# Patient Record
Sex: FEMALE | Race: WHITE | NOT HISPANIC OR LATINO | Employment: OTHER | URBAN - METROPOLITAN AREA
[De-identification: names, ages, dates, MRNs, and addresses within clinical notes are randomized per-mention and may not be internally consistent; named-entity substitution may affect disease eponyms.]

---

## 2017-06-26 ENCOUNTER — APPOINTMENT (OUTPATIENT)
Dept: WOMENS IMAGING | Facility: HOSPITAL | Age: 72
End: 2017-06-26

## 2017-06-26 PROCEDURE — 77063 BREAST TOMOSYNTHESIS BI: CPT | Performed by: RADIOLOGY

## 2017-06-26 PROCEDURE — G0202 SCR MAMMO BI INCL CAD: HCPCS | Performed by: RADIOLOGY

## 2017-07-05 ENCOUNTER — APPOINTMENT (OUTPATIENT)
Dept: WOMENS IMAGING | Facility: HOSPITAL | Age: 72
End: 2017-07-05

## 2017-07-05 PROCEDURE — 76641 ULTRASOUND BREAST COMPLETE: CPT | Performed by: RADIOLOGY

## 2018-04-03 ENCOUNTER — APPOINTMENT (OUTPATIENT)
Dept: WOMENS IMAGING | Facility: HOSPITAL | Age: 73
End: 2018-04-03

## 2018-04-03 PROCEDURE — 76641 ULTRASOUND BREAST COMPLETE: CPT | Performed by: RADIOLOGY

## 2018-04-03 PROCEDURE — 77065 DX MAMMO INCL CAD UNI: CPT | Performed by: RADIOLOGY

## 2018-04-03 PROCEDURE — G0279 TOMOSYNTHESIS, MAMMO: HCPCS | Performed by: RADIOLOGY

## 2019-09-10 ENCOUNTER — APPOINTMENT (OUTPATIENT)
Dept: WOMENS IMAGING | Facility: HOSPITAL | Age: 74
End: 2019-09-10

## 2019-09-10 PROCEDURE — 77063 BREAST TOMOSYNTHESIS BI: CPT | Performed by: RADIOLOGY

## 2019-09-10 PROCEDURE — 77067 SCR MAMMO BI INCL CAD: CPT | Performed by: RADIOLOGY

## 2021-07-30 ENCOUNTER — APPOINTMENT (OUTPATIENT)
Dept: WOMENS IMAGING | Facility: HOSPITAL | Age: 76
End: 2021-07-30

## 2021-07-30 PROCEDURE — 77063 BREAST TOMOSYNTHESIS BI: CPT | Performed by: RADIOLOGY

## 2021-07-30 PROCEDURE — 77067 SCR MAMMO BI INCL CAD: CPT | Performed by: RADIOLOGY

## 2022-08-23 ENCOUNTER — APPOINTMENT (OUTPATIENT)
Dept: WOMENS IMAGING | Facility: HOSPITAL | Age: 77
End: 2022-08-23

## 2022-08-23 PROCEDURE — 77067 SCR MAMMO BI INCL CAD: CPT | Performed by: RADIOLOGY

## 2022-08-23 PROCEDURE — 77063 BREAST TOMOSYNTHESIS BI: CPT | Performed by: RADIOLOGY

## 2022-09-07 ENCOUNTER — APPOINTMENT (OUTPATIENT)
Dept: WOMENS IMAGING | Facility: HOSPITAL | Age: 77
End: 2022-09-07

## 2022-09-07 PROCEDURE — G0279 TOMOSYNTHESIS, MAMMO: HCPCS | Performed by: RADIOLOGY

## 2022-09-07 PROCEDURE — 76642 ULTRASOUND BREAST LIMITED: CPT | Performed by: RADIOLOGY

## 2022-09-07 PROCEDURE — 77065 DX MAMMO INCL CAD UNI: CPT | Performed by: RADIOLOGY

## 2022-09-20 ENCOUNTER — HOSPITAL ENCOUNTER (OUTPATIENT)
Dept: ULTRASOUND IMAGING | Facility: HOSPITAL | Age: 77
Discharge: HOME | End: 2022-09-20

## 2022-09-20 ENCOUNTER — HOSPITAL ENCOUNTER (OUTPATIENT)
Dept: MAMMOGRAPHY | Facility: HOSPITAL | Age: 77
Discharge: HOME | End: 2022-09-20

## 2022-09-20 DIAGNOSIS — N63.20 MASS OF LEFT BREAST: ICD-10-CM

## 2022-09-20 DIAGNOSIS — R92.8 ABNORMAL MAMMOGRAM OF LEFT BREAST: ICD-10-CM

## 2022-09-20 PROCEDURE — 88360 TUMOR IMMUNOHISTOCHEM/MANUAL: CPT | Performed by: FAMILY MEDICINE

## 2022-09-20 PROCEDURE — 0 LIDOCAINE 1 % SOLUTION: Performed by: FAMILY MEDICINE

## 2022-09-20 PROCEDURE — 88305 TISSUE EXAM BY PATHOLOGIST: CPT | Performed by: FAMILY MEDICINE

## 2022-09-20 RX ORDER — LIDOCAINE HYDROCHLORIDE AND EPINEPHRINE 10; 10 MG/ML; UG/ML
10 INJECTION, SOLUTION INFILTRATION; PERINEURAL ONCE
Status: COMPLETED | OUTPATIENT
Start: 2022-09-20 | End: 2022-09-20

## 2022-09-20 RX ORDER — LIDOCAINE HYDROCHLORIDE 10 MG/ML
5 INJECTION, SOLUTION INFILTRATION; PERINEURAL ONCE
Status: COMPLETED | OUTPATIENT
Start: 2022-09-20 | End: 2022-09-20

## 2022-09-20 RX ADMIN — Medication 5 ML: at 13:53

## 2022-09-20 RX ADMIN — LIDOCAINE HYDROCHLORIDE,EPINEPHRINE BITARTRATE 10 ML: 10; .01 INJECTION, SOLUTION INFILTRATION; PERINEURAL at 13:53

## 2022-09-23 LAB
LAB AP CASE REPORT: NORMAL
LAB AP CLINICAL INFORMATION: NORMAL
LAB AP DIAGNOSIS COMMENT: NORMAL
PATH REPORT.ADDENDUM SPEC: NORMAL
PATH REPORT.FINAL DX SPEC: NORMAL
PATH REPORT.GROSS SPEC: NORMAL

## 2022-09-28 ENCOUNTER — OFFICE VISIT (OUTPATIENT)
Dept: SURGERY | Facility: CLINIC | Age: 77
End: 2022-09-28

## 2022-09-28 VITALS
WEIGHT: 193 LBS | OXYGEN SATURATION: 94 % | TEMPERATURE: 98.4 F | HEIGHT: 69 IN | BODY MASS INDEX: 28.58 KG/M2 | HEART RATE: 59 BPM | DIASTOLIC BLOOD PRESSURE: 85 MMHG | SYSTOLIC BLOOD PRESSURE: 168 MMHG

## 2022-09-28 DIAGNOSIS — C50.212 MALIGNANT NEOPLASM OF UPPER-INNER QUADRANT OF LEFT BREAST IN FEMALE, ESTROGEN RECEPTOR POSITIVE: Primary | ICD-10-CM

## 2022-09-28 DIAGNOSIS — Z17.0 MALIGNANT NEOPLASM OF UPPER-INNER QUADRANT OF LEFT BREAST IN FEMALE, ESTROGEN RECEPTOR POSITIVE: Primary | ICD-10-CM

## 2022-09-28 PROBLEM — I34.0 MILD MITRAL REGURGITATION: Status: ACTIVE | Noted: 2020-05-06

## 2022-09-28 PROBLEM — N39.46 MIXED INCONTINENCE: Status: ACTIVE | Noted: 2022-09-28

## 2022-09-28 PROBLEM — N95.9 MENOPAUSAL AND POSTMENOPAUSAL DISORDER: Status: ACTIVE | Noted: 2022-09-28

## 2022-09-28 PROBLEM — I10 BENIGN ESSENTIAL HYPERTENSION: Status: ACTIVE | Noted: 2022-09-28

## 2022-09-28 PROBLEM — N81.4 UTEROVAGINAL PROLAPSE: Status: ACTIVE | Noted: 2022-09-28

## 2022-09-28 PROBLEM — C50.919 MALIGNANT NEOPLASM OF FEMALE BREAST: Status: ACTIVE | Noted: 2022-09-28

## 2022-09-28 PROBLEM — E55.9 VITAMIN D DEFICIENCY: Status: ACTIVE | Noted: 2022-09-28

## 2022-09-28 PROBLEM — E78.5 HYPERLIPIDEMIA: Status: ACTIVE | Noted: 2019-09-18

## 2022-09-28 LAB — REF LAB TEST METHOD: NORMAL

## 2022-09-28 PROCEDURE — 99204 OFFICE O/P NEW MOD 45 MIN: CPT | Performed by: SURGERY

## 2022-09-28 RX ORDER — CARBOXYMETHYLCELLULOSE SODIUM 10 MG/ML
GEL OPHTHALMIC 3 TIMES DAILY PRN
COMMUNITY
End: 2022-10-20 | Stop reason: HOSPADM

## 2022-09-28 RX ORDER — DIPHENOXYLATE HYDROCHLORIDE AND ATROPINE SULFATE 2.5; .025 MG/1; MG/1
1 TABLET ORAL DAILY
COMMUNITY

## 2022-09-28 RX ORDER — NEBIVOLOL HYDROCHLORIDE 10 MG/1
10 TABLET ORAL EVERY EVENING
COMMUNITY
Start: 2022-07-01

## 2022-09-28 RX ORDER — AMLODIPINE BESYLATE 5 MG/1
5 TABLET ORAL DAILY
COMMUNITY
Start: 2022-07-01

## 2022-09-28 RX ORDER — ASPIRIN 81 MG/1
81 TABLET, CHEWABLE ORAL DAILY
COMMUNITY

## 2022-09-30 RX ORDER — CHLORHEXIDINE GLUCONATE 0.12 MG/ML
15 RINSE ORAL EVERY 12 HOURS SCHEDULED
Status: CANCELLED | OUTPATIENT
Start: 2022-09-30

## 2022-09-30 RX ORDER — LIDOCAINE 40 MG/G
1 CREAM TOPICAL AS NEEDED
Status: CANCELLED | OUTPATIENT
Start: 2022-09-30

## 2022-09-30 RX ORDER — SODIUM CHLORIDE 9 MG/ML
100 INJECTION, SOLUTION INTRAVENOUS CONTINUOUS
Status: CANCELLED | OUTPATIENT
Start: 2022-09-30

## 2022-09-30 NOTE — PROGRESS NOTES
GENERAL SURGERY CONSULTATION NOTE    Consult requested by: Dr. Blackman    Patient Care Team:  Sherine Blackman MD as PCP - General (Family Medicine)  Jaime De Anda MD as Surgeon (General Surgery)    Reason for consult: Left breast cancer    Subjective     Patient is a 77 y.o. female presents with a new diagnosis of left breast cancer.  The patient's past surgical history is significant for right breast cancer for which she underwent a modified radical mastectomy and TRAM flap in 1995.  She has done well until recently when on her routine screening mammogram there was noted to be a mass in the left breast at the 11 o'clock position.  She then subsequently underwent a diagnostic mammogram and ultrasound of the left breast on 9/7/2022 which demonstrated a 9 x 8 x 6 mm irregular solid mass in the posterior one third region of the left breast at the 11 o'clock position which was located 8 cm from the nipple.  This was considered to be suspicious and biopsy was recommended.  The patient underwent core needle biopsy which demonstrated invasive well differentiated ductal carcinoma.  This was ER positive at 100%, SC positive at 90%.  The patient's past medical history is significant as above.  The patient began having her periods at age 15, she went through menopause at the age of 54 and still has her uterus and both ovaries.  She has been pregnant 4 times with 2 live births and 2 miscarriages.  Her first child was born when she was 25 years old and she never breast-fed.  She took birth control pills for approximately 1 year and is never taken any hormone replacement therapy  Family history is as below.     Review of Systems   Constitutional: Negative for appetite change, chills and fever.   HENT: Positive for tinnitus. Negative for congestion and sore throat.    Respiratory: Negative for cough and shortness of breath.    Cardiovascular: Negative for chest pain and palpitations.   Gastrointestinal:  Negative for abdominal pain, constipation, diarrhea, nausea, vomiting and GERD.   Genitourinary: Negative for breast discharge, breast lump, breast pain, difficulty urinating, dysuria and frequency.   Musculoskeletal: Negative for arthralgias and back pain.   Skin: Negative for rash and skin lesions.   Neurological: Negative for dizziness, seizures and memory problem.   Hematological: Negative for adenopathy. Does not bruise/bleed easily.   Psychiatric/Behavioral: Negative for sleep disturbance and depressed mood.        History  History reviewed. No pertinent past medical history.  Past Surgical History:   Procedure Laterality Date   • CATARACT EXTRACTION     • SIMPLE MASTECTOMY Right     1995   • TONSILLECTOMY     • TOOTH EXTRACTION       History reviewed. No pertinent family history.  Social History     Tobacco Use   • Smoking status: Never Smoker   • Smokeless tobacco: Never Used   Vaping Use   • Vaping Use: Never used     (Not in a hospital admission)    Allergies:  Statins, Acetaminophen, and Latex    Objective     Vital Signs       Physical Exam  Vitals reviewed. Exam conducted with a chaperone present.   Constitutional:       Appearance: She is well-developed.   HENT:      Head: Normocephalic and atraumatic.   Eyes:      Pupils: Pupils are equal, round, and reactive to light.   Cardiovascular:      Rate and Rhythm: Normal rate and regular rhythm.   Pulmonary:      Effort: Pulmonary effort is normal.      Breath sounds: Normal breath sounds.   Chest:   Breasts:      Right: No axillary adenopathy or supraclavicular adenopathy.      Left: No axillary adenopathy or supraclavicular adenopathy.        Comments: The patient's right breast is surgically absent with a well-healed incision from her prior TRAM flap.  There is no evidence of any necrosis of the flap, and no surrounding erythema, ration, or drainage from the incisions.  The left breast is of normal shape and contour without any dominant masses.  There is  no discharge from the nipple, no overlying skin changes.  Abdominal:      General: There is no distension.      Palpations: Abdomen is soft.      Tenderness: There is no abdominal tenderness.      Hernia: No hernia is present.   Musculoskeletal:         General: Normal range of motion.      Cervical back: Normal range of motion.   Lymphadenopathy:      Cervical: No cervical adenopathy.      Upper Body:      Right upper body: No supraclavicular or axillary adenopathy.      Left upper body: No supraclavicular or axillary adenopathy.   Skin:     General: Skin is warm and dry.      Findings: No rash.   Neurological:      Mental Status: She is alert and oriented to person, place, and time.         Results Review:   Lab Results (last 24 hours)     ** No results found for the last 24 hours. **        No radiology results for the last day      I reviewed the patient's new imaging results and agree with the interpretation.  I reviewed the patient's other test results and agree with the interpretation    Assessment & Plan   Left breast cancer    We reviewed the patient's imaging, and her pathology reports in detail.  We discussed that breast cancer is treated in a multidisciplinary fashion, with input from radiation oncology, medical oncology, and general surgery.  We discussed that the patient's case will be discussed at a multidisciplinary tumor board meeting held every other week.  We then discussed the diagnosis of breast cancer and that most breast cancers arise either from the ducts or from the lobules.  Using visual aids, we discussed the difference between invasive and in situ disease, especially whether or not the lymph nodes need to be evaluated.  Usually, with in situ disease, lymph nodes are not examined.  However, examining the lymph nodes should be considered if the area of concern is widespread or if it is high-grade.  We also discussed hormone receptors, and discussed that there are medications which can be  given if the hormone receptors were positive which can be used to treat the cancer, and to provide protection in not only the breast with cancer, but the contralateral breast as well.  The surgical options were discussed with the patient which include breast conservation therapy (lumpectomy with radiation) and mastectomy.  With regards to mastectomy, we discussed that reconstruction may be performed either at the time of the surgery, or in a delayed fashion.  We discussed that the survival benefit between these 2 procedures are equivalent, and therefore they are considered oncologically appropriate.  However, some women will choose one versus the other for a multitude of factors.  There are times when a lumpectomy is not a feasible option, these include but are not limited to tumor to breast ratio, the location of the tumor, previous radiation to the chest wall, or connective tissue disorders which would make radiation treatment ineffective.  This patient has elected to undergo breast conservation therapy and understands that this may include a lumpectomy with evaluation of her lymph nodes, followed by radiation.  There may or may not be a role for chemotherapy or hormonal therapy following her surgery as well.  This will be managed by medical oncology.  We discussed that in order for the surgery to be effective we will need to have clear surgical margins, and that if the margins are positive, then she may require further surgical excision versus a prolonged course of radiation.  The risk, benefits, and alternatives to surgery were discussed with the patient which include but are not limited to: bleeding, infection, damage to nerves/blood vessels, and pain.    The patient understands, and agrees to proceed with wire localized left breast lumpectomy with sentinel lymph node biopsy.    I discussed the patients findings and my recommendations with the patient and her .     Jaime De Anda,  MD  09/30/22  17:54 EDT

## 2022-10-10 RX ORDER — SPIRONOLACTONE 25 MG/1
25 TABLET ORAL DAILY PRN
COMMUNITY

## 2022-10-10 RX ORDER — MELATONIN
1000 EVERY EVENING
COMMUNITY

## 2022-10-10 RX ORDER — CYCLOBENZAPRINE HCL 10 MG
10 TABLET ORAL AS NEEDED
COMMUNITY

## 2022-10-10 RX ORDER — MOMETASONE FUROATE 1 MG/G
1 CREAM TOPICAL AS NEEDED
COMMUNITY

## 2022-10-20 ENCOUNTER — HOSPITAL ENCOUNTER (OUTPATIENT)
Facility: HOSPITAL | Age: 77
Setting detail: HOSPITAL OUTPATIENT SURGERY
Discharge: HOME | End: 2022-10-20
Attending: SURGERY | Admitting: SURGERY

## 2022-10-20 ENCOUNTER — HOSPITAL ENCOUNTER (OUTPATIENT)
Dept: MAMMOGRAPHY | Facility: HOSPITAL | Age: 77
Discharge: HOME | End: 2022-10-20

## 2022-10-20 ENCOUNTER — ANESTHESIA EVENT (OUTPATIENT)
Dept: PERIOP | Facility: HOSPITAL | Age: 77
End: 2022-10-20

## 2022-10-20 ENCOUNTER — HOSPITAL ENCOUNTER (OUTPATIENT)
Dept: NUCLEAR MEDICINE | Facility: HOSPITAL | Age: 77
Discharge: HOME | End: 2022-10-20

## 2022-10-20 ENCOUNTER — ANESTHESIA (OUTPATIENT)
Dept: PERIOP | Facility: HOSPITAL | Age: 77
End: 2022-10-20

## 2022-10-20 ENCOUNTER — APPOINTMENT (OUTPATIENT)
Dept: ULTRASOUND IMAGING | Facility: HOSPITAL | Age: 77
End: 2022-10-20

## 2022-10-20 ENCOUNTER — HOSPITAL ENCOUNTER (OUTPATIENT)
Dept: ULTRASOUND IMAGING | Facility: HOSPITAL | Age: 77
Discharge: HOME | End: 2022-10-20

## 2022-10-20 VITALS
SYSTOLIC BLOOD PRESSURE: 157 MMHG | WEIGHT: 193 LBS | HEIGHT: 68 IN | TEMPERATURE: 97.8 F | DIASTOLIC BLOOD PRESSURE: 80 MMHG | BODY MASS INDEX: 29.25 KG/M2 | HEART RATE: 73 BPM | OXYGEN SATURATION: 97 % | RESPIRATION RATE: 9 BRPM

## 2022-10-20 DIAGNOSIS — C50.212 MALIGNANT NEOPLASM OF UPPER-INNER QUADRANT OF LEFT BREAST IN FEMALE, ESTROGEN RECEPTOR POSITIVE: ICD-10-CM

## 2022-10-20 DIAGNOSIS — Z17.0 MALIGNANT NEOPLASM OF UPPER-INNER QUADRANT OF LEFT BREAST IN FEMALE, ESTROGEN RECEPTOR POSITIVE: ICD-10-CM

## 2022-10-20 PROCEDURE — 25010000002 CEFAZOLIN PER 500 MG: Performed by: SURGERY

## 2022-10-20 PROCEDURE — 88307 TISSUE EXAM BY PATHOLOGIST: CPT | Performed by: SURGERY

## 2022-10-20 PROCEDURE — 25010000002 PROPOFOL 200 MG/20ML EMULSION: Performed by: NURSE ANESTHETIST, CERTIFIED REGISTERED

## 2022-10-20 PROCEDURE — 25010000002 FENTANYL CITRATE (PF) 50 MCG/ML SOLUTION: Performed by: NURSE ANESTHETIST, CERTIFIED REGISTERED

## 2022-10-20 PROCEDURE — 25010000002 DEXAMETHASONE PER 1 MG: Performed by: NURSE ANESTHETIST, CERTIFIED REGISTERED

## 2022-10-20 PROCEDURE — 25010000002 ONDANSETRON PER 1 MG: Performed by: NURSE ANESTHETIST, CERTIFIED REGISTERED

## 2022-10-20 PROCEDURE — C1819 TISSUE LOCALIZATION-EXCISION: HCPCS

## 2022-10-20 PROCEDURE — 19301 PARTIAL MASTECTOMY: CPT | Performed by: SURGERY

## 2022-10-20 PROCEDURE — 0 TECHETIUM TC99M TILMANOCEPT: Performed by: SURGERY

## 2022-10-20 PROCEDURE — 38792 RA TRACER ID OF SENTINL NODE: CPT

## 2022-10-20 PROCEDURE — 76098 X-RAY EXAM SURGICAL SPECIMEN: CPT

## 2022-10-20 PROCEDURE — A9520 TC99 TILMANOCEPT DIAG 0.5MCI: HCPCS | Performed by: SURGERY

## 2022-10-20 PROCEDURE — 76999 ECHO EXAMINATION PROCEDURE: CPT

## 2022-10-20 PROCEDURE — 0 LIDOCAINE 1 % SOLUTION: Performed by: SURGERY

## 2022-10-20 PROCEDURE — 38525 BIOPSY/REMOVAL LYMPH NODES: CPT | Performed by: SURGERY

## 2022-10-20 PROCEDURE — 38900 IO MAP OF SENT LYMPH NODE: CPT | Performed by: SURGERY

## 2022-10-20 RX ORDER — HYDROCODONE BITARTRATE AND ACETAMINOPHEN 10; 325 MG/1; MG/1
1 TABLET ORAL EVERY 4 HOURS PRN
Status: DISCONTINUED | OUTPATIENT
Start: 2022-10-20 | End: 2022-10-20 | Stop reason: HOSPADM

## 2022-10-20 RX ORDER — SODIUM CHLORIDE, SODIUM LACTATE, POTASSIUM CHLORIDE, CALCIUM CHLORIDE 600; 310; 30; 20 MG/100ML; MG/100ML; MG/100ML; MG/100ML
INJECTION, SOLUTION INTRAVENOUS CONTINUOUS PRN
Status: DISCONTINUED | OUTPATIENT
Start: 2022-10-20 | End: 2022-10-20 | Stop reason: SURG

## 2022-10-20 RX ORDER — HYDROCODONE BITARTRATE AND ACETAMINOPHEN 10; 325 MG/1; MG/1
1 TABLET ORAL ONCE AS NEEDED
Status: DISCONTINUED | OUTPATIENT
Start: 2022-10-20 | End: 2022-10-20 | Stop reason: HOSPADM

## 2022-10-20 RX ORDER — HYDROCODONE BITARTRATE AND ACETAMINOPHEN 5; 325 MG/1; MG/1
1-2 TABLET ORAL EVERY 4 HOURS PRN
Qty: 30 TABLET | Refills: 0 | Status: SHIPPED | OUTPATIENT
Start: 2022-10-20 | End: 2022-10-31

## 2022-10-20 RX ORDER — SODIUM CHLORIDE 9 MG/ML
100 INJECTION, SOLUTION INTRAVENOUS CONTINUOUS
Status: DISCONTINUED | OUTPATIENT
Start: 2022-10-20 | End: 2022-10-20 | Stop reason: HOSPADM

## 2022-10-20 RX ORDER — BUPIVACAINE HYDROCHLORIDE AND EPINEPHRINE 5; 5 MG/ML; UG/ML
INJECTION, SOLUTION EPIDURAL; INTRACAUDAL; PERINEURAL AS NEEDED
Status: DISCONTINUED | OUTPATIENT
Start: 2022-10-20 | End: 2022-10-20 | Stop reason: HOSPADM

## 2022-10-20 RX ORDER — ONDANSETRON 2 MG/ML
INJECTION INTRAMUSCULAR; INTRAVENOUS AS NEEDED
Status: DISCONTINUED | OUTPATIENT
Start: 2022-10-20 | End: 2022-10-20 | Stop reason: SURG

## 2022-10-20 RX ORDER — EPHEDRINE SULFATE 5 MG/ML
INJECTION INTRAVENOUS AS NEEDED
Status: DISCONTINUED | OUTPATIENT
Start: 2022-10-20 | End: 2022-10-20 | Stop reason: SURG

## 2022-10-20 RX ORDER — PROPOFOL 10 MG/ML
INJECTION, EMULSION INTRAVENOUS AS NEEDED
Status: DISCONTINUED | OUTPATIENT
Start: 2022-10-20 | End: 2022-10-20 | Stop reason: SURG

## 2022-10-20 RX ORDER — LIDOCAINE 40 MG/G
1 CREAM TOPICAL AS NEEDED
Status: DISCONTINUED | OUTPATIENT
Start: 2022-10-20 | End: 2022-10-20 | Stop reason: HOSPADM

## 2022-10-20 RX ORDER — DEXAMETHASONE SODIUM PHOSPHATE 4 MG/ML
INJECTION, SOLUTION INTRA-ARTICULAR; INTRALESIONAL; INTRAMUSCULAR; INTRAVENOUS; SOFT TISSUE AS NEEDED
Status: DISCONTINUED | OUTPATIENT
Start: 2022-10-20 | End: 2022-10-20 | Stop reason: SURG

## 2022-10-20 RX ORDER — FENTANYL CITRATE 50 UG/ML
INJECTION, SOLUTION INTRAMUSCULAR; INTRAVENOUS AS NEEDED
Status: DISCONTINUED | OUTPATIENT
Start: 2022-10-20 | End: 2022-10-20 | Stop reason: SURG

## 2022-10-20 RX ORDER — LIDOCAINE HYDROCHLORIDE 10 MG/ML
10 INJECTION, SOLUTION INFILTRATION; PERINEURAL ONCE
Status: COMPLETED | OUTPATIENT
Start: 2022-10-20 | End: 2022-10-20

## 2022-10-20 RX ORDER — LIDOCAINE HYDROCHLORIDE 20 MG/ML
INJECTION, SOLUTION EPIDURAL; INFILTRATION; INTRACAUDAL; PERINEURAL AS NEEDED
Status: DISCONTINUED | OUTPATIENT
Start: 2022-10-20 | End: 2022-10-20 | Stop reason: SURG

## 2022-10-20 RX ORDER — CHLORHEXIDINE GLUCONATE 0.12 MG/ML
15 RINSE ORAL EVERY 12 HOURS SCHEDULED
Status: DISCONTINUED | OUTPATIENT
Start: 2022-10-20 | End: 2022-10-20

## 2022-10-20 RX ORDER — CHLORHEXIDINE GLUCONATE 0.12 MG/ML
15 RINSE ORAL EVERY 12 HOURS SCHEDULED
Status: DISCONTINUED | OUTPATIENT
Start: 2022-10-20 | End: 2022-10-20 | Stop reason: HOSPADM

## 2022-10-20 RX ADMIN — SODIUM CHLORIDE 100 ML/HR: 0.9 INJECTION, SOLUTION INTRAVENOUS at 12:30

## 2022-10-20 RX ADMIN — SODIUM CHLORIDE, SODIUM LACTATE, POTASSIUM CHLORIDE, AND CALCIUM CHLORIDE: .6; .31; .03; .02 INJECTION, SOLUTION INTRAVENOUS at 14:30

## 2022-10-20 RX ADMIN — EPHEDRINE SULFATE 5 MG: 5 INJECTION INTRAVENOUS at 15:08

## 2022-10-20 RX ADMIN — CEFAZOLIN 2 G: 2 INJECTION, POWDER, FOR SOLUTION INTRAMUSCULAR; INTRAVENOUS at 14:30

## 2022-10-20 RX ADMIN — CHLORHEXIDINE GLUCONATE 15 ML: 1.2 SOLUTION ORAL at 13:00

## 2022-10-20 RX ADMIN — FENTANYL CITRATE 50 MCG: 50 INJECTION, SOLUTION INTRAMUSCULAR; INTRAVENOUS at 14:35

## 2022-10-20 RX ADMIN — DEXAMETHASONE SODIUM PHOSPHATE 4 MG: 4 INJECTION, SOLUTION INTRAMUSCULAR; INTRAVENOUS at 14:28

## 2022-10-20 RX ADMIN — LIDOCAINE 4% 1 APPLICATION: 4 CREAM TOPICAL at 12:00

## 2022-10-20 RX ADMIN — ONDANSETRON 8 MG: 2 INJECTION INTRAMUSCULAR; INTRAVENOUS at 14:31

## 2022-10-20 RX ADMIN — PROPOFOL 200 MG: 10 INJECTION, EMULSION INTRAVENOUS at 14:36

## 2022-10-20 RX ADMIN — FENTANYL CITRATE 50 MCG: 50 INJECTION, SOLUTION INTRAMUSCULAR; INTRAVENOUS at 15:21

## 2022-10-20 RX ADMIN — LIDOCAINE HYDROCHLORIDE 60 MG: 20 INJECTION, SOLUTION EPIDURAL; INFILTRATION; INTRACAUDAL at 14:28

## 2022-10-20 RX ADMIN — PROPOFOL 200 MG: 10 INJECTION, EMULSION INTRAVENOUS at 14:28

## 2022-10-20 RX ADMIN — TILMANOCEPT 1 DOSE: KIT at 13:30

## 2022-10-20 RX ADMIN — LIDOCAINE HYDROCHLORIDE 10 ML: 10 INJECTION, SOLUTION INFILTRATION; PERINEURAL at 13:22

## 2022-10-20 RX ADMIN — MUPIROCIN 2 APPLICATION: 20 OINTMENT TOPICAL at 13:22

## 2022-10-20 NOTE — ANESTHESIA PROCEDURE NOTES
Airway  Urgency: elective    Airway not difficult    General Information and Staff    Patient location during procedure: OR    Indications and Patient Condition  Indications for airway management: airway protection    Preoxygenated: yes  MILS maintained throughout  Mask difficulty assessment: 0 - not attempted    Final Airway Details  Final airway type: supraglottic airway      Successful airway: LMA  Size 4     Cormack-Lehane Classification: grade I - full view of glottis  Number of attempts at approach: 1  Assessment: lips, teeth, and gum same as pre-op

## 2022-10-20 NOTE — OP NOTE
BREAST LUMPECTOMY, SENTINEL NODE BIOPSY  Operative Note    Patient Name:  Shasha Freeman  YOB: 1945    Date of Surgery:  10/20/2022     Indications:  Patient is a 77-year-old lady who presented to the office with a left breast cancer.  We discussed the risk, benefits, and alternatives to surgery, the patient agreed to proceed the operating room for wire localized lumpectomy with sentinel lymph node biopsy on the left.    Pre-op Diagnosis:   Malignant neoplasm of upper-inner quadrant of left breast in female, estrogen receptor positive (HCC) [C50.212, Z17.0]    Post-op Diagnosis:  Post-Op Diagnosis Codes:     * Malignant neoplasm of upper-inner quadrant of left breast in female, estrogen receptor positive (HCC) [C50.212, Z17.0]    Procedure/CPT® Codes:      Procedure(s):  BREAST LUMPECTOMY  SENTINEL NODE BIOPSY    Staff:  Surgeon(s):  Jaime De Anda MD    Assistant: Allie Caceres was responsible for performing the following activities: Retraction, Suction and Closing and their skilled assistance was necessary for the success of this case.     Anesthesia: General    Estimated Blood Loss: minimal    Implants:    Nothing was implanted during the procedure    Specimen:          Specimens     ID Source Type Tests Collected By Collected At Frozen?    A Breast, Left Tissue · TISSUE PATHOLOGY EXAM   Jaime De Anda MD 10/20/22 4301     Description: Left breast lumpectomy short-superior, long-lateral, double-deep    Comment: Please call ext [phone]    B Laurens Lymph Node Tissue · TISSUE PATHOLOGY EXAM   Jaime De Anda MD 10/20/22 2694     Description: Laurens node     This specimen was not marked as sent.          Findings: Wire and mass within the lumpectomy specimen, discussed with Dr. Landin with radiology, and the clip could not be seen radiographically, nor could it be identified on ultrasound, however the mass did appear to be within the specimen.  Laurens lymph node  measuring 6449 on neoprobe ex vivo    Complications: None, immediately    Description of Procedure: After obtaining informed consent in the preop holding area, the patient was brought the operating room placed in supine position.  SCDs were applied, preoperative antibiotics were administered.  Patient then underwent uncomplicated induction of general endotracheal anesthesia.  The left chest and axilla were then prepped and draped in the usual sterile fashion, and after a brief timeout the procedure began.  I began by making a curvilinear incision on the superior aspect of the nipple areolar complex and dissected superiorly until the wire could be seen entering the incision above the subcutaneous fat.  After doing this, I brought the wire through the incision, and grasped the surrounding subcutaneous fat and breast tissue with an Allis clamp.  Using electrocautery I was able to circumferentially excise a lump of subcutaneous fat and breast tissue surrounding the breast mass.  With the lump now completely removed, it was marked with a short stitch marking the superior margin, long stitch marking the lateral margin, double stitch marking the deep margin.  The specimen was then passed off for mammographic review where it was confirmed that the wire and the mass were within the lumpectomy specimen.  It was discussed with Dr. Landin who then also performed an ultrasound of the specimen confirming the presence of the mass within the specimen, because the clip could not be adequately visualized mammographically nor sonographically.  However, given the fact that the wire was in good position and the mass can be seen within the specimen it was felt as though the lumpectomy was adequate.  I then turned my attention towards location of a sentinel lymph node which was to be removed for treatment purposes.  I found an area of increased uptake within the left axilla I made an incision in the left axilla below the hairbearing  area.  I dissected down through the subcutaneous fat using electrocautery to the level of the clavipectoral fascia which was incised using electrocautery to expose the axillary fat pad below.  I then found an area of increased uptake and grasped the axillary fat pad surrounding the lymph node with an Allis clamp.  I then used electrocautery to divide the small lymphatic and feeding blood vessels to the lymph node.  With the lymph node now completely removed, it was examined ex vivo and measured 6449 on neoprobe.  I then returned to the axilla and the lumpectomy sites and confirmed hemostasis using electrocautery.  Both sites were irrigated with copious amounts of warm sterile water.  We then injected local anesthesia and then closed the incisions using interrupted 3-0 Vicryl to reapproximate the deep subcutaneous fat layers, interrupted 3-0 Vicryl to reapproximate the dermis, and running 4-0 Monocryl in a subcuticular fashion to reapproximate the skin.  The wounds were dressed with skin glue, fluffs, ABD, and a brassiere.  The patient tolerated the procedure well, was awoken, and taken to PACU in satisfactory condition.    Jaime De Anda MD     Date: 10/20/2022  Time: 15:35 EDT

## 2022-10-20 NOTE — DISCHARGE INSTRUCTIONS
Ok for discharge  Follow-up with me (Dr. De Anda) in 2 weeks  Regular diet as tolerated  Ok to shower starting tomorrow. No tub baths, pools, lakes, or streams for 1 week.  Ok to apply ice to incisions  Please wear brassiere at all times except when showering  Call my office or present to the ED with: fevers greater than 101.5, redness around the incisions, drainage from the incisions, intractable nausea/vomiting, or pain that is getting worse instead of better

## 2022-10-20 NOTE — ANESTHESIA POSTPROCEDURE EVALUATION
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        Post Anesthesia Care and Evaluation    Patient location during evaluation: PACU  Patient participation: complete - patient participated  Level of consciousness: awake  Pain scale: See nurse's notes for pain score.  Pain management: adequate    Airway patency: patent  Anesthetic complications: No anesthetic complications  PONV Status: none  Cardiovascular status: acceptable  Respiratory status: acceptable and spontaneous ventilation  Hydration status: acceptable    Comments: Patient seen and examined postoperatively; vital signs stable; SpO2 greater than or equal to 90%; cardiopulmonary status stable; nausea/vomiting adequately controlled; pain adequately controlled; no apparent anesthesia complications; patient discharged from anesthesia care when discharge criteria were met

## 2022-10-21 ENCOUNTER — TELEPHONE (OUTPATIENT)
Dept: SURGERY | Facility: CLINIC | Age: 77
End: 2022-10-21

## 2022-10-21 LAB
LAB AP CASE REPORT: NORMAL
LAB AP CLINICAL INFORMATION: NORMAL
LAB AP SYNOPTIC CHECKLIST: NORMAL
PATH REPORT.FINAL DX SPEC: NORMAL
PATH REPORT.GROSS SPEC: NORMAL

## 2022-10-31 ENCOUNTER — OFFICE VISIT (OUTPATIENT)
Dept: SURGERY | Facility: CLINIC | Age: 77
End: 2022-10-31

## 2022-10-31 VITALS
DIASTOLIC BLOOD PRESSURE: 71 MMHG | HEIGHT: 68 IN | OXYGEN SATURATION: 95 % | TEMPERATURE: 98.6 F | HEART RATE: 61 BPM | SYSTOLIC BLOOD PRESSURE: 160 MMHG | WEIGHT: 194.4 LBS | RESPIRATION RATE: 16 BRPM | BODY MASS INDEX: 29.46 KG/M2

## 2022-10-31 DIAGNOSIS — Z17.0 MALIGNANT NEOPLASM OF UPPER-INNER QUADRANT OF LEFT BREAST IN FEMALE, ESTROGEN RECEPTOR POSITIVE: Primary | ICD-10-CM

## 2022-10-31 DIAGNOSIS — C50.212 MALIGNANT NEOPLASM OF UPPER-INNER QUADRANT OF LEFT BREAST IN FEMALE, ESTROGEN RECEPTOR POSITIVE: Primary | ICD-10-CM

## 2022-10-31 PROCEDURE — 99024 POSTOP FOLLOW-UP VISIT: CPT | Performed by: SURGERY

## 2022-11-01 ENCOUNTER — HOSPITAL ENCOUNTER (OUTPATIENT)
Dept: RADIATION ONCOLOGY | Facility: HOSPITAL | Age: 77
Setting detail: RADIATION/ONCOLOGY SERIES
End: 2022-11-01

## 2022-11-01 ENCOUNTER — NURSE NAVIGATOR (OUTPATIENT)
Dept: ONCOLOGY | Facility: CLINIC | Age: 77
End: 2022-11-01

## 2022-11-01 ENCOUNTER — CONSULT (OUTPATIENT)
Dept: RADIATION ONCOLOGY | Facility: HOSPITAL | Age: 77
End: 2022-11-01

## 2022-11-01 VITALS
RESPIRATION RATE: 16 BRPM | TEMPERATURE: 98.4 F | BODY MASS INDEX: 29.7 KG/M2 | DIASTOLIC BLOOD PRESSURE: 78 MMHG | SYSTOLIC BLOOD PRESSURE: 164 MMHG | HEART RATE: 67 BPM | WEIGHT: 196 LBS | OXYGEN SATURATION: 97 % | HEIGHT: 68 IN

## 2022-11-01 DIAGNOSIS — Z17.0 MALIGNANT NEOPLASM OF LOWER-INNER QUADRANT OF LEFT BREAST IN FEMALE, ESTROGEN RECEPTOR POSITIVE: Primary | ICD-10-CM

## 2022-11-01 DIAGNOSIS — C50.312 MALIGNANT NEOPLASM OF LOWER-INNER QUADRANT OF LEFT BREAST IN FEMALE, ESTROGEN RECEPTOR POSITIVE: Primary | ICD-10-CM

## 2022-11-01 PROCEDURE — 99205 OFFICE O/P NEW HI 60 MIN: CPT | Performed by: RADIOLOGY

## 2022-11-01 PROCEDURE — G0463 HOSPITAL OUTPT CLINIC VISIT: HCPCS | Performed by: RADIOLOGY

## 2022-11-01 NOTE — PROGRESS NOTES
RADIATION THERAPY CONSULT NOTE    NAME: Shasha Freeman  YOB: 1945  MRN #: [id number]  DATE OF SERVICE: 11/1/2022  REFERRING PROVIDER: Jaime De Anda MD  [address] [place] [address]  [place] [id number]  PRIMARY CARE PROVIDER: Sherine Blackman MD    DIAGNOSIS:  [id number], L Breast Cancer, ER/CA+Her2-, LUIQ  Encounter Diagnosis   Name Primary?   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                              REQUESTING PHYSICIAN:    Jaime De Anda Md  [address] [place] [address]  [place] [id number]    RECORDS OBTAINED:  Records of the patients history including those obtained from the referring provider were reviewed and summarized in detail.    HISTORY OF PRESENT ILLNESS:  Shasha Freeman is a 77 y.o. female who was recently diagnosed with left breast cancer.  Past surgical history is significant for right breast cancer for which she underwent a modified radical mastectomy and TRAM flap in 1995.  Did not require chemo or XRT.    She completed her routine screening mammogram on 8/23/2022 at [facility] that showed left breast mass in the 11 o'clock position. She went on to complete a diagnostic mammogram on 9/7/2022 that showed a suspicious 9 x 8 x 6 mm irregular solid mass in the posterior one third region of the left breast at the 11 o'clock position located 8 cm from the nipple.  Ultrasound biopsy was recommended and completed on 9/20/2022 at [facility].  Pathology revealed invasive well differentiated ductal carcinoma, Philadelphia grade 5/9, no in situ identified, largest continuous invasive tumor focus is 0.8 cm, ER+/CA+/HER2 negative.    She was seen in consult  by Dr. Jaime De Anda on 9/28/2022.  All treatment options were discussed.  Patient ultimately elected to undergo breast conservation therapy with wire localized left breast lumpectomy and sentinel lymph node biopsy.    She underwent left breast lumpectomy with SLNB on 10/20/2022. Pathology revealed moderately differentiated ductal carcinoma, Grade 2, 1.3 cm, 0/1 LN positive, closest margin 3 mm anteriorly, ER positive (100%), UT positive (81-90%), HER2 negative (1+).    She began having her periods at age 15. She went through menopause at the age of 54 and still has her uterus and both ovaries. She has been pregnant 4 times with 2 live births and 2 miscarriages. Her first child was born when she was 25 years old and she never breast-fed. She took birth control pills for approximately 1 year and has never taken any hormone replacement therapy.    Clinically, she has done very well post-op and has no clinical concerns today.      The following portions of the patient's history were reviewed and updated as appropriate: allergies, current medications, past family history, past medical history, past social history, past surgical history and problem list. Reviewed with the patient and remain unchanged.    PAST MEDICAL HISTORY:  she has a past medical history of Cancer (HCC), Dermatitis, Herpes simplex, Hypertension, Macular degeneration, Osteopenia, and Peripheral edema.    MEDICATIONS:    Current Outpatient Medications:   •  amLODIPine (NORVASC) 5 MG tablet, Take 1 tablet by mouth Daily. Take preop, Disp: , Rfl:   •  aspirin 81 MG chewable tablet, Chew 1 tablet Daily. Ld  10/15, Disp: , Rfl:   •  Bystolic 10 MG tablet, Take 1 tablet by mouth Every Evening., Disp: , Rfl:   •  cholecalciferol (VITAMIN D3) 25 MCG (1000 UT) tablet, Take 1 tablet by mouth Every Evening. Ld 10/13, Disp: , Rfl:   •  cyclobenzaprine (FLEXERIL) 10 MG tablet, Take 1 tablet by mouth As Needed for Muscle Spasms. None preop, Disp: , Rfl:   •   mometasone (ELOCON) 0.1 % cream, Apply 1 application topically to the appropriate area as directed As Needed. None after bathing started, Disp: , Rfl:   •  Multiple Vitamins-Minerals (ICAPS AREDS 2 PO), Take 1 tablet by mouth 2 (Two) Times a Day. Ld 10/13, Disp: , Rfl:   •  multivitamin (THERAGRAN) tablet tablet, Take 1 tablet by mouth Daily. Ld 10/13, Disp: , Rfl:   •  spironolactone (ALDACTONE) 25 MG tablet, Take 1 tablet by mouth Daily As Needed. None am surgery, Disp: , Rfl:     ALLERGIES:    Allergies   Allergen Reactions   • Statins Myalgia   • Acetaminophen Palpitations   • Latex Rash       PAST SURGICAL HISTORY:  she has a past surgical history that includes Simple mastectomy (Right); Tonsillectomy; Tooth extraction; Cataract extraction (Bilateral); Breast reconstruction (Right); Breast lumpectomy (Left, 10/20/2022); and [place] Node Biopsy (Left, 10/20/2022).    PREVIOUS RADIOTHERAPY OR CHEMOTHERAPY:  no    FAMILY HISTORY:  Her mother had cervical cancer when she was middle aged and NHL at age 60 or 70.  An uncle had colon cancer, age of diagnosis is unknown.  She is not aware of any bleeding or blood clotting problems in her family    SOCIAL HISTORY:  she reports that she has never smoked. She has never used smokeless tobacco. She reports that she does not drink alcohol and does not use drugs. Lives in [place], [place], Retired, accompanied by her .  She has two adult children, one son and one daughter.  She has never used tobacco products or alcohol.      PAIN AND PAIN MANAGEMENT:  No pain.    NUTRITIONAL STATUS:   no issues    KPS:  100: Normal; no evidence of disease  PHQ-9 Total Score: distress tool completed.     REVIEW OF SYSTEMS:   General: No fevers, chills, weight change, or drenching night sweats. Skin: No rashes or jaundice.  HEENT: No change in vision or hearing, no headaches.  Neck: No dysphagia or masses.  Heme/Lymph: No easy bruising or bleeding.  Respiratory System: No shortness of breath  or cough.  Cardiovascular: No chest pain, palpitations, or dyspnea on exertion.  - Pacemaker. GI: No nausea, vomiting, diarrhea, melena, or hematochezia.  : No dysuria or hematuria.  Endocrine: No heat or cold intolerance. Musculoskeletal: No myalgias or arthralgias.  Neuro: No weakness, numbness, syncope, or seizures. Psych: No mood changes or depression. Ext: Denies swelling.      Objective   VITAL SIGNS:  Vitals:    11/01/22 0955   BP: 164/78   Pulse: 67   Resp: 16   Temp: 98.4 °F (36.9 °C)   SpO2: 97%         PHYSICAL EXAM:  GENERAL:  No apparent distress. Sitting comfortably in room.    HEENT:  Normocephalic, atraumatic. Pupils are equal, round, reactive to light. Sclera anicteric. Conjunctiva not injected. Oropharynx without erythema, ulcerations or thrush.   NECK:  Supple with no masses.  LYMPHATIC:  No cervical, supraclavicular or axillary adenopathy appreciated bilaterally.   CARDIOVASCULAR:  S1 & S2 detected; no murmurs, rubs or gallops.  CHEST:  Clear to auscultation bilaterally; no wheezes, crackles or rubs. Work of breathing normal.  ABDOMEN:  Bowel sounds present. Abdomen is soft, nontender, nondistended.   MUSCULOSKELETAL:  No tenderness to palpation along the spine or scapulae. Normal range of motion.  EXTREMITIES:  No clubbing, cyanosis, edema.  SKIN:  No erythema, rashes, ulcerations noted.   NEUROLOGIC:  Cranial nerves II-XII grossly intact bilaterally. No focal neurologic deficits.  PSYCHIATRIC:  Alert, aware, and appropriate.      PERTINENT IMAGING/PATHOLOGY/LABS (Medical Decision Making):      COORDINATION OF CARE:  A copy of this note is sent to the referring provider.    PATHOLOGY (Reviewed):     INVASIVE CARCINOMA OF THE BREAST: Resection  8th Edition - Protocol posted: 6/22/2022  INVASIVE CARCINOMA OF THE BREAST: COMPLETE EXCISION - All Specimens  SPECIMEN   Procedure  Excision (less than total mastectomy)    Specimen Laterality  Left    TUMOR   Tumor Site  Lower inner quadrant      Clock  position      11 o'clock    Histologic Type  Invasive carcinoma of no special type (ductal)    Histologic Grade (Ayaan Histologic Score)     Glandular (Acinar) / Tubular Differentiation  Score 2    Nuclear Pleomorphism  Score 3    Mitotic Rate  Score 1    Overall Grade  Grade 2 (scores of 6 or 7)    Tumor Size  Greatest dimension of largest invasive focus (Millimeters): 13 mm   Tumor Focality  Single focus of invasive carcinoma    Ductal Carcinoma In Situ (DCIS)  Present      Negative for extensive intraductal component (EIC)    Size (Extent) of DCIS  Estimated size (extent) of DCIS is at least (Millimeters): 10 mm   Architectural Patterns  Cribriform    Nuclear Grade  Grade II (intermediate)    Necrosis  Not identified    Lobular Carcinoma In Situ (LCIS)  Not identified    Lymphovascular Invasion  Not identified    Dermal Lymphovascular Invasion  No skin present    Microcalcifications  Not identified    Treatment Effect in the Breast  No known presurgical therapy    MARGINS   Margin Status for Invasive Carcinoma  All margins negative for invasive carcinoma    Distance from Invasive Carcinoma to Closest Margin  3 mm   Closest Margin(s) to Invasive Carcinoma  Anterior    Margin Status for DCIS  All margins negative for DCIS    Distance from DCIS to Closest Margin  3 mm   Closest Margin(s) to DCIS  Anterior    REGIONAL LYMPH NODES   Regional Lymph Node Status  All regional lymph nodes negative for tumor    Total Number of Lymph Nodes Examined (sentinel and non-sentinel)  1    Number of [place] Nodes Examined  1    PATHOLOGIC STAGE CLASSIFICATION (pTNM, AJCC 8th Edition)   Reporting of pT, pN, and (when applicable) pM categories is based on information available to the pathologist at the time the report is issued. As per the AJCC (Chapter 1, 8th Ed.) it is the managing physician's responsibility to establish the final pathologic stage based upon all pertinent information, including but potentially not limited to  this pathology report.   pT Category  pT1c    Regional Lymph Nodes Modifier  (sn): [place] node(s) evaluated.    pN Category  pN0    ADDITIONAL FINDINGS   Additional Findings  Biopsy site changes    Breast Biomarker Testing Performed on Previous Biopsy     Estrogen Receptor (ER) Status  Positive (greater than 10% of cells demonstrate nuclear positivity)    Percentage of Cells with Nuclear Positivity  100 %   Breast Biomarker Testing Performed on Previous Biopsy     Progesterone Receptor (PgR) Status  Positive    Percentage of Cells with Nuclear Positivity  81-90%    Breast Biomarker Testing Performed on Previous Biopsy     HER2 (by immunohistochemistry)  Negative (Score 1+)    Testing Performed on Case Number  [id number]          IMAGING (Reviewed):     LABS (Reviewed):    Assessment & Plan   ASSESSMENT AND PLAN:    1. Malignant neoplasm of lower-inner quadrant of left breast in female, estrogen receptor positive (HCC)       -Prior Rt breast cancer  -No prior chemo or XRT  -Left breast cancer newly diagnosed with successful breast conserving surgery, [id number], margns 3 mm and ER/VT+Her2-    -Today we discussed XRT in lay terms, including discussion of NCCN guidelines and prior clinical trials that allow adjuvant XRT + AI vs. AI alone as acceptable adjuvant treatment strategies.  -We discussed AI medications and did give a handout at her request.  -We also discussed DIBH radiation techniques for cardiac sparing and a handout is given as well.    She tells me that she is favoring AI alone and will alert us if she changes her mind.  She is going out of town to see her daughter and plans to talk over but does at this point favor AI + close MMG surveillance.    All questions are answered.    Sending note to Med/onc team about her genetic requests and to see if already done and ask about oncotype.       This assessment comes from my review of the imaging, pathology, physician notes and other pertinent information as  mentioned.    DISPOSITION:  appt with JONO next on 11/29/2022---will see if Oncotype Dx and Genetic testing done      TIME SPENT WITH PATIENT:  I spent 65 minutes caring for Shasha on this date of service. This time includes time spent by me in the following activities: preparing for the visit, reviewing tests, obtaining and/or reviewing a separately obtained history, performing a medically appropriate examination and/or evaluation, counseling and educating the patient/family/caregiver, referring and communicating with other health care professionals, documenting information in the medical record and care coordination           CC: Jaime De Anda* Sherine Blackman MD    Approved by:     Jaime He MD  11/01/22  13:01 EDT

## 2022-11-02 NOTE — PROGRESS NOTES
"Post-op Note    Subjective   Shasha Freeman is a 77 y.o. female status post left lumpectomy and sentinel lymph node biopsy performed on 10/20/2022 for invasive left breast cancer.  Overall, the patient is doing well without any significant pain.  She is pleased with her cosmetic outcome.      Objective   /71 (BP Location: Left arm, Patient Position: Sitting, Cuff Size: Large Adult)   Pulse 61   Temp 98.6 °F (37 °C) (Infrared)   Resp 16   Ht 172.7 cm (68\")   Wt 88.2 kg (194 lb 6.4 oz)   SpO2 95%   BMI 29.56 kg/m²   Patient has a curvilinear incision on the left breast which appears to be well-healed without any surrounding erythema, ration, or drainage to suggest infection.  The left axillary incision is also well-healed with minimal swelling underneath the incision.      Assessment & Plan   Patient is a 77-year-old lady status post left lumpectomy and sentinel lymph node biopsy performed on 10/20/2022 for invasive left breast cancer.    Pathology reviewed with patient which demonstrated residual moderately differentiated ductal carcinoma measuring 1.3 cm completely excised, 1 sentinel lymph node negative for malignancy  Recommend follow-up with me in 6 months.  No need for further surgical intervention at this time  Given the findings on the final size, the patient will follow-up with medical oncology, and may benefit from Oncotype DX to see whether or not there would be any role for chemotherapy.  The patient likely will decline any chemotherapy and does not seem interested in this, but I will defer the decision making as to whether or not to obtain this to the patient's medical oncologist Dr. Garcia.  The patient will see a benefit with hormonal manipulation, and I think this would be prudent going forward.  I do think that the patient would benefit from discussions about potential adjuvant radiation therapy, and I will refer the patient to radiation oncology to discuss whether or not there is any " role for radiation therapy going forward.  Again, the patient does not seem interested in radiation therapy.  Given the fact that this is her second primary breast cancer, I do think the patient would also benefit from possible genetic testing if not already performed in 1995    Jaime De Anda MD  11/2/2022  13:55 EDT

## 2022-11-02 NOTE — PROGRESS NOTES
I accompanied the patient to her appointment with Dr. He.    Dr. He discussed role for radiation therapy in women 70 years of age or older.  He discussed that radiation does not give her a survival benefit but it does decrease her risk of recurrence from 8-10% to 1-2%.  He did discuss with the patient that is she were to be on endocrine therapy due to her ER positive breast cancer, then he would feel confident in the patient's decision to not receive radiation therapy.    He discussed with the patient that the decision to proceed with radiation is her decision.     He stated that he would offer her 16 treatments which are Monday through Friday.    The patient stated that at this time she does not want to proceed with radiation but requested information on radiation therapy and hormonal therapy.  I gave the patient a skin care folder for breast cancer patients and a informational packet on hormonal therapy.    The patient stated that if she were to change her mind, she would call and notify us.    Dr. He was also wondering if Dr. Cooney had sent for either mammaprint or oncotype Dx.      I called and left a message with Dr. Cooney's office requesting this information.

## 2023-01-03 ENCOUNTER — TRANSCRIBE ORDERS (OUTPATIENT)
Dept: ADMINISTRATIVE | Facility: HOSPITAL | Age: 78
End: 2023-01-03
Payer: MEDICARE

## 2023-01-03 ENCOUNTER — LAB (OUTPATIENT)
Dept: LAB | Facility: HOSPITAL | Age: 78
End: 2023-01-03
Payer: MEDICARE

## 2023-01-03 ENCOUNTER — HOSPITAL ENCOUNTER (OUTPATIENT)
Dept: CARDIOLOGY | Facility: HOSPITAL | Age: 78
Discharge: HOME | End: 2023-01-03
Payer: MEDICARE

## 2023-01-03 ENCOUNTER — HOSPITAL ENCOUNTER (OUTPATIENT)
Dept: GENERAL RADIOLOGY | Facility: HOSPITAL | Age: 78
Discharge: HOME | End: 2023-01-03
Payer: MEDICARE

## 2023-01-03 DIAGNOSIS — Z01.818 PRE-OP TESTING: ICD-10-CM

## 2023-01-03 DIAGNOSIS — Z01.818 PRE-OP TESTING: Primary | ICD-10-CM

## 2023-01-03 LAB
ABO GROUP BLD: NORMAL
ANION GAP SERPL CALCULATED.3IONS-SCNC: 9.3 MMOL/L (ref 5–15)
BASOPHILS # BLD AUTO: 0.04 10*3/MM3 (ref 0–0.2)
BASOPHILS NFR BLD AUTO: 0.7 % (ref 0–1.5)
BLD GP AB SCN SERPL QL: NEGATIVE
BUN SERPL-MCNC: 11 MG/DL (ref 8–23)
BUN/CREAT SERPL: 13.6 (ref 7–25)
CALCIUM SPEC-SCNC: 9.4 MG/DL (ref 8.6–10.5)
CHLORIDE SERPL-SCNC: 104 MMOL/L (ref 98–107)
CO2 SERPL-SCNC: 29.7 MMOL/L (ref 22–29)
CREAT SERPL-MCNC: 0.81 MG/DL (ref 0.57–1)
DEPRECATED RDW RBC AUTO: 40.1 FL (ref 37–54)
EGFRCR SERPLBLD CKD-EPI 2021: 75.8 ML/MIN/1.73
EOSINOPHIL # BLD AUTO: 0.09 10*3/MM3 (ref 0–0.4)
EOSINOPHIL NFR BLD AUTO: 1.5 % (ref 0.3–6.2)
ERYTHROCYTE [DISTWIDTH] IN BLOOD BY AUTOMATED COUNT: 12.3 % (ref 12.3–15.4)
GLUCOSE SERPL-MCNC: 96 MG/DL (ref 65–99)
HCT VFR BLD AUTO: 43 % (ref 34–46.6)
HGB BLD-MCNC: 14.3 G/DL (ref 12–15.9)
IMM GRANULOCYTES # BLD AUTO: 0.02 10*3/MM3 (ref 0–0.05)
IMM GRANULOCYTES NFR BLD AUTO: 0.3 % (ref 0–0.5)
LYMPHOCYTES # BLD AUTO: 1.53 10*3/MM3 (ref 0.7–3.1)
LYMPHOCYTES NFR BLD AUTO: 26.2 % (ref 19.6–45.3)
MCH RBC QN AUTO: 29.9 PG (ref 26.6–33)
MCHC RBC AUTO-ENTMCNC: 33.3 G/DL (ref 31.5–35.7)
MCV RBC AUTO: 89.8 FL (ref 79–97)
MONOCYTES # BLD AUTO: 0.53 10*3/MM3 (ref 0.1–0.9)
MONOCYTES NFR BLD AUTO: 9.1 % (ref 5–12)
NEUTROPHILS NFR BLD AUTO: 3.62 10*3/MM3 (ref 1.7–7)
NEUTROPHILS NFR BLD AUTO: 62.2 % (ref 42.7–76)
NRBC BLD AUTO-RTO: 0 /100 WBC (ref 0–0.2)
PLATELET # BLD AUTO: 268 10*3/MM3 (ref 140–450)
PMV BLD AUTO: 11.5 FL (ref 6–12)
POTASSIUM SERPL-SCNC: 4 MMOL/L (ref 3.5–5.2)
QT INTERVAL: 403 MS
RBC # BLD AUTO: 4.79 10*6/MM3 (ref 3.77–5.28)
RH BLD: POSITIVE
SARS-COV-2 ORF1AB RESP QL NAA+PROBE: NOT DETECTED
SODIUM SERPL-SCNC: 143 MMOL/L (ref 136–145)
T&S EXPIRATION DATE: NORMAL
WBC NRBC COR # BLD: 5.83 10*3/MM3 (ref 3.4–10.8)

## 2023-01-03 PROCEDURE — 86850 RBC ANTIBODY SCREEN: CPT

## 2023-01-03 PROCEDURE — 86900 BLOOD TYPING SEROLOGIC ABO: CPT

## 2023-01-03 PROCEDURE — 71046 X-RAY EXAM CHEST 2 VIEWS: CPT

## 2023-01-03 PROCEDURE — 93010 ELECTROCARDIOGRAM REPORT: CPT | Performed by: INTERNAL MEDICINE

## 2023-01-03 PROCEDURE — 86901 BLOOD TYPING SEROLOGIC RH(D): CPT

## 2023-01-03 PROCEDURE — U0005 INFEC AGEN DETEC AMPLI PROBE: HCPCS

## 2023-01-03 PROCEDURE — 93005 ELECTROCARDIOGRAM TRACING: CPT | Performed by: OBSTETRICS & GYNECOLOGY

## 2023-01-03 PROCEDURE — 85025 COMPLETE CBC W/AUTO DIFF WBC: CPT

## 2023-01-03 PROCEDURE — 36415 COLL VENOUS BLD VENIPUNCTURE: CPT

## 2023-01-03 PROCEDURE — U0004 COV-19 TEST NON-CDC HGH THRU: HCPCS

## 2023-01-03 PROCEDURE — C9803 HOPD COVID-19 SPEC COLLECT: HCPCS

## 2023-01-03 PROCEDURE — 80048 BASIC METABOLIC PNL TOTAL CA: CPT

## 2023-01-12 ENCOUNTER — LAB REQUISITION (OUTPATIENT)
Dept: LAB | Facility: HOSPITAL | Age: 78
End: 2023-01-12
Payer: MEDICARE

## 2023-01-12 DIAGNOSIS — N81.11 CYSTOCELE, MIDLINE: ICD-10-CM

## 2023-01-12 DIAGNOSIS — N81.4 UTEROVAGINAL PROLAPSE, UNSPECIFIED: ICD-10-CM

## 2023-01-12 DIAGNOSIS — N81.6 RECTOCELE: ICD-10-CM

## 2023-01-12 PROCEDURE — 88307 TISSUE EXAM BY PATHOLOGIST: CPT | Performed by: OBSTETRICS & GYNECOLOGY

## 2023-01-13 ENCOUNTER — LAB REQUISITION (OUTPATIENT)
Dept: LAB | Facility: HOSPITAL | Age: 78
End: 2023-01-13
Payer: MEDICARE

## 2023-01-13 DIAGNOSIS — N81.4 UTEROVAGINAL PROLAPSE, UNSPECIFIED: ICD-10-CM

## 2023-01-13 DIAGNOSIS — N81.11 CYSTOCELE, MIDLINE: ICD-10-CM

## 2023-01-13 DIAGNOSIS — N81.6 RECTOCELE: ICD-10-CM

## 2023-01-13 LAB
BASOPHILS # BLD AUTO: 0 10*3/MM3 (ref 0–0.2)
BASOPHILS NFR BLD AUTO: 0.2 % (ref 0–1.5)
DEPRECATED RDW RBC AUTO: 43.8 FL (ref 37–54)
EOSINOPHIL # BLD AUTO: 0 10*3/MM3 (ref 0–0.4)
EOSINOPHIL NFR BLD AUTO: 0 % (ref 0.3–6.2)
ERYTHROCYTE [DISTWIDTH] IN BLOOD BY AUTOMATED COUNT: 13.5 % (ref 12.3–15.4)
HCT VFR BLD AUTO: 36.4 % (ref 34–46.6)
HGB BLD-MCNC: 12 G/DL (ref 12–15.9)
LAB AP CASE REPORT: NORMAL
LYMPHOCYTES # BLD AUTO: 1.2 10*3/MM3 (ref 0.7–3.1)
LYMPHOCYTES NFR BLD AUTO: 8.5 % (ref 19.6–45.3)
MCH RBC QN AUTO: 29.3 PG (ref 26.6–33)
MCHC RBC AUTO-ENTMCNC: 33 G/DL (ref 31.5–35.7)
MCV RBC AUTO: 88.7 FL (ref 79–97)
MONOCYTES # BLD AUTO: 0.9 10*3/MM3 (ref 0.1–0.9)
MONOCYTES NFR BLD AUTO: 6.2 % (ref 5–12)
NEUTROPHILS NFR BLD AUTO: 11.7 10*3/MM3 (ref 1.7–7)
NEUTROPHILS NFR BLD AUTO: 85.1 % (ref 42.7–76)
NRBC BLD AUTO-RTO: 0.1 /100 WBC (ref 0–0.2)
PATH REPORT.FINAL DX SPEC: NORMAL
PATH REPORT.GROSS SPEC: NORMAL
PLATELET # BLD AUTO: 222 10*3/MM3 (ref 140–450)
PMV BLD AUTO: 9.5 FL (ref 6–12)
RBC # BLD AUTO: 4.11 10*6/MM3 (ref 3.77–5.28)
WBC NRBC COR # BLD: 13.7 10*3/MM3 (ref 3.4–10.8)

## 2023-01-13 PROCEDURE — 85025 COMPLETE CBC W/AUTO DIFF WBC: CPT | Performed by: OBSTETRICS & GYNECOLOGY

## 2023-03-22 ENCOUNTER — TRANSCRIBE ORDERS (OUTPATIENT)
Dept: ADMINISTRATIVE | Facility: HOSPITAL | Age: 78
End: 2023-03-22
Payer: MEDICARE

## 2023-03-22 DIAGNOSIS — Z12.31 SCREENING MAMMOGRAM, ENCOUNTER FOR: Primary | ICD-10-CM

## 2023-04-21 ENCOUNTER — HOSPITAL ENCOUNTER (OUTPATIENT)
Dept: MAMMOGRAPHY | Facility: HOSPITAL | Age: 78
End: 2023-04-21
Payer: MEDICARE

## 2023-04-21 ENCOUNTER — HOSPITAL ENCOUNTER (OUTPATIENT)
Dept: MAMMOGRAPHY | Facility: HOSPITAL | Age: 78
Discharge: HOME | End: 2023-04-21
Payer: MEDICARE

## 2023-04-21 DIAGNOSIS — Z09 FOLLOW-UP EXAM: ICD-10-CM

## 2023-04-21 PROCEDURE — G0279 TOMOSYNTHESIS, MAMMO: HCPCS

## 2023-04-21 PROCEDURE — 77065 DX MAMMO INCL CAD UNI: CPT

## 2023-05-01 ENCOUNTER — OFFICE VISIT (OUTPATIENT)
Dept: SURGERY | Facility: CLINIC | Age: 78
End: 2023-05-01
Payer: MEDICARE

## 2023-05-01 VITALS
OXYGEN SATURATION: 95 % | RESPIRATION RATE: 16 BRPM | TEMPERATURE: 97.3 F | BODY MASS INDEX: 30.01 KG/M2 | WEIGHT: 198 LBS | SYSTOLIC BLOOD PRESSURE: 180 MMHG | DIASTOLIC BLOOD PRESSURE: 84 MMHG | HEART RATE: 69 BPM | HEIGHT: 68 IN

## 2023-05-01 DIAGNOSIS — Z17.0 MALIGNANT NEOPLASM OF UPPER-INNER QUADRANT OF LEFT BREAST IN FEMALE, ESTROGEN RECEPTOR POSITIVE: Primary | ICD-10-CM

## 2023-05-01 DIAGNOSIS — C50.212 MALIGNANT NEOPLASM OF UPPER-INNER QUADRANT OF LEFT BREAST IN FEMALE, ESTROGEN RECEPTOR POSITIVE: Primary | ICD-10-CM

## 2023-05-01 PROCEDURE — 99213 OFFICE O/P EST LOW 20 MIN: CPT | Performed by: SURGERY

## 2023-05-01 PROCEDURE — 1159F MED LIST DOCD IN RCRD: CPT | Performed by: SURGERY

## 2023-05-01 PROCEDURE — 3077F SYST BP >= 140 MM HG: CPT | Performed by: SURGERY

## 2023-05-01 PROCEDURE — 3079F DIAST BP 80-89 MM HG: CPT | Performed by: SURGERY

## 2023-05-01 PROCEDURE — 1160F RVW MEDS BY RX/DR IN RCRD: CPT | Performed by: SURGERY

## 2023-05-01 RX ORDER — ESTRADIOL 0.1 MG/G
CREAM VAGINAL
COMMUNITY
Start: 2023-04-21

## 2023-05-01 NOTE — PROGRESS NOTES
GENERAL SURGERY ESTABLISHED PATIENT NOTE    Patient Care Team:  Sherine Blackman MD as PCP - General (Family Medicine)  Jaime De Anda MD as Surgeon (General Surgery)    Reason for follow-up: 6-month follow-up from left lumpectomy and sentinel lymph node biopsy    Subjective     Patient is a 78 y.o. female presents for 6-month follow-up after undergoing a left lumpectomy and sentinel lymph node biopsy on 10/20/2022.  The patient's pathology demonstrated residual moderately differentiated ductal carcinoma measuring 1.3 cm completely excised and 1 sentinel lymph node which was negative for malignancy.  The patient was seen by radiation oncology postoperatively and declined radiation therapy.  She was also seen by medical oncology and declined endocrine therapy.  Instead, she was having difficulty with prolapse and she followed up with her OB/GYN and subsequently underwent a complete hysterectomy.  She has recovered from this, and is doing well.  She is not having any pain or issues.  She had a diagnostic mammogram of the left performed on 4/21/2023 which demonstrated no mammographic signs of malignancy in the left breast and it was recommended that the patient return to annual routine screening mammography.  Her past medical history is significant for breast cancer on the right side which was treated with a mastectomy and a TRAM flap many years ago.    Review of Systems   Genitourinary: Negative for breast discharge, breast lump and breast pain.   Hematological: Negative for adenopathy.        History  Past Medical History:   Diagnosis Date   • Cancer     left breast   right with reconstruction   • Dermatitis     ears   • Herpes simplex     about every 5 years   • Hypertension    • Macular degeneration     right eye  minimal   • Osteopenia    • Peripheral edema     with travel at times     Past Surgical History:   Procedure Laterality Date   • BREAST LUMPECTOMY Left 10/20/2022    Procedure: BREAST  LUMPECTOMY;  Surgeon: Jaime De Anda MD;  Location: [facility] MAIN OR;  Service: General;  Laterality: Left;   • BREAST RECONSTRUCTION Right    • CATARACT EXTRACTION Bilateral     with IOL   • HYSTERECTOMY  01/12/2023   • SENTINEL NODE BIOPSY Left 10/20/2022    Procedure: SENTINEL NODE BIOPSY;  Surgeon: Jaime De Anda MD;  Location: [facility] MAIN OR;  Service: General;  Laterality: Left;   • SIMPLE MASTECTOMY Right     1995   • TONSILLECTOMY     • TOOTH EXTRACTION       No family history on file.  Social History     Tobacco Use   • Smoking status: Never     Passive exposure: Never   • Smokeless tobacco: Never   Vaping Use   • Vaping Use: Never used   Substance Use Topics   • Alcohol use: Never   • Drug use: Never     (Not in a hospital admission)    Allergies:  Statins, Acetaminophen, and Latex    Objective     Vital Signs  Temp:  [97.3 °F (36.3 °C)] 97.3 °F (36.3 °C)  Heart Rate:  [67-69] 69  Resp:  [16] 16  BP: (180)/(84-92) 180/84    Physical Exam  Vitals reviewed. Exam conducted with a chaperone present.   Constitutional:       Appearance: She is well-developed.   HENT:      Head: Normocephalic and atraumatic.   Eyes:      Pupils: Pupils are equal, round, and reactive to light.   Cardiovascular:      Rate and Rhythm: Normal rate and regular rhythm.   Pulmonary:      Effort: Pulmonary effort is normal.      Breath sounds: Normal breath sounds.   Chest:      Comments: Both breasts were examined the upright and supine position.  Both breasts exhibit normal shape and contour.  The right breast is status postmastectomy with changes of a TRAM flap noted.  There are no overlying skin changes or palpable subcutaneous masses noted.  The left breast has a well-healed curvilinear incision on the upper outer aspect of the nipple areolar complex and a well-healed transverse incision in the left axilla.  No overlying skin changes are noted, no palpable masses, no discharge from the nipple.  Abdominal:       General: There is no distension.      Palpations: Abdomen is soft.      Tenderness: There is no abdominal tenderness.      Hernia: No hernia is present.   Musculoskeletal:         General: Normal range of motion.      Cervical back: Normal range of motion.   Lymphadenopathy:      Cervical: No cervical adenopathy.      Upper Body:      Right upper body: No supraclavicular or axillary adenopathy.      Left upper body: No supraclavicular or axillary adenopathy.   Skin:     General: Skin is warm and dry.      Findings: No rash.   Neurological:      Mental Status: She is alert and oriented to person, place, and time.         Results Review:   Lab Results (last 24 hours)     ** No results found for the last 24 hours. **        No radiology results for the last day      I reviewed the patient's new imaging results and agree with the interpretation.  I reviewed the patient's other test results and agree with the interpretation    Assessment & Plan   Left breast cancer    Recommend continue to follow-up with Dr. Cooney as an outpatient.  He should continue to follow the patient with clinical breast exams and imaging.  Recommend continued clinical examinations by me every 6 months for 2 years following surgery.  May follow-up sooner if issues arise  Patient is aware that she has declined radiation therapy and endocrine therapy both of which may reduce the risk of recurrence in the future.    I discussed the patients findings and my recommendations with the patient.     Jaime De Anda MD  05/01/23  09:50 EDT

## 2023-10-23 ENCOUNTER — OFFICE VISIT (OUTPATIENT)
Dept: SURGERY | Facility: CLINIC | Age: 78
End: 2023-10-23
Payer: MEDICARE

## 2023-10-23 VITALS
DIASTOLIC BLOOD PRESSURE: 159 MMHG | TEMPERATURE: 98.2 F | RESPIRATION RATE: 18 BRPM | OXYGEN SATURATION: 94 % | WEIGHT: 197.4 LBS | SYSTOLIC BLOOD PRESSURE: 197 MMHG | HEART RATE: 70 BPM | BODY MASS INDEX: 29.92 KG/M2 | HEIGHT: 68 IN

## 2023-10-23 DIAGNOSIS — C50.212 MALIGNANT NEOPLASM OF UPPER-INNER QUADRANT OF LEFT BREAST IN FEMALE, ESTROGEN RECEPTOR POSITIVE: Primary | ICD-10-CM

## 2023-10-23 DIAGNOSIS — Z17.0 MALIGNANT NEOPLASM OF UPPER-INNER QUADRANT OF LEFT BREAST IN FEMALE, ESTROGEN RECEPTOR POSITIVE: Primary | ICD-10-CM

## 2023-10-23 PROCEDURE — 3077F SYST BP >= 140 MM HG: CPT | Performed by: SURGERY

## 2023-10-23 PROCEDURE — 99213 OFFICE O/P EST LOW 20 MIN: CPT | Performed by: SURGERY

## 2023-10-23 PROCEDURE — 1160F RVW MEDS BY RX/DR IN RCRD: CPT | Performed by: SURGERY

## 2023-10-23 PROCEDURE — 1159F MED LIST DOCD IN RCRD: CPT | Performed by: SURGERY

## 2023-10-23 PROCEDURE — 3080F DIAST BP >= 90 MM HG: CPT | Performed by: SURGERY

## 2023-10-26 NOTE — PROGRESS NOTES
GENERAL SURGERY ESTABLISHED PATIENT NOTE    Patient Care Team:  Sherine Blackman MD as PCP - General (Family Medicine)  Jaime De Anda MD as Surgeon (General Surgery)  Kwasi Rich MD as Consulting Physician (Hematology and Oncology)    Reason for follow-up: 6-month follow-up from left lumpectomy and sentinel lymph node biopsy    Subjective     Patient is a 78 y.o. female presents for 6-month follow-up after undergoing a left lumpectomy and sentinel lymph node biopsy on 10/20/2022.  The patient's pathology demonstrated residual moderately differentiated ductal carcinoma measuring 1.3 cm completely excised and 1 sentinel lymph node which was negative for malignancy.  The patient was seen by radiation oncology postoperatively and declined radiation therapy.  She was also seen by medical oncology and declined endocrine therapy.  Instead, she was having difficulty with prolapse and she followed up with her OB/GYN and subsequently underwent a complete hysterectomy.  She has recovered from this, and is doing well.  She is not having any pain or issues.  She had a diagnostic mammogram of the left performed on 4/21/2023 which demonstrated no mammographic signs of malignancy in the left breast and it was recommended that the patient return to annual routine screening mammography.  She has not had any recent mammography performed since.  Her past medical history is significant for breast cancer on the right side which was treated with a mastectomy and a TRAM flap many years ago.    Review of Systems   Genitourinary:  Negative for breast discharge, breast lump and breast pain.   Hematological:  Negative for adenopathy.        History  Past Medical History:   Diagnosis Date    Cancer     left breast   right with reconstruction    Dermatitis     ears    Herpes simplex     about every 5 years    Hypertension     Macular degeneration     right eye  minimal    Osteopenia     Peripheral edema     with travel at  times     Past Surgical History:   Procedure Laterality Date    BREAST LUMPECTOMY Left 10/20/2022    Procedure: BREAST LUMPECTOMY;  Surgeon: Jaime De Anda MD;  Location: [facility] MAIN OR;  Service: General;  Laterality: Left;    BREAST RECONSTRUCTION Right     CATARACT EXTRACTION Bilateral     with IOL    HYSTERECTOMY  01/12/2023    SENTINEL NODE BIOPSY Left 10/20/2022    Procedure: SENTINEL NODE BIOPSY;  Surgeon: Jaime De Anda MD;  Location: [facility] MAIN OR;  Service: General;  Laterality: Left;    SIMPLE MASTECTOMY Right     1995    TONSILLECTOMY      TOOTH EXTRACTION       No family history on file.  Social History     Tobacco Use    Smoking status: Never     Passive exposure: Never    Smokeless tobacco: Never   Vaping Use    Vaping Use: Never used   Substance Use Topics    Alcohol use: Never    Drug use: Never     (Not in a hospital admission)    Allergies:  Statins, Acetaminophen, and Latex    Objective     Vital Signs       Physical Exam  Vitals reviewed. Exam conducted with a chaperone present.   Constitutional:       Appearance: She is well-developed.   HENT:      Head: Normocephalic and atraumatic.   Eyes:      Pupils: Pupils are equal, round, and reactive to light.   Cardiovascular:      Rate and Rhythm: Normal rate and regular rhythm.   Pulmonary:      Effort: Pulmonary effort is normal.      Breath sounds: Normal breath sounds.   Chest:      Comments: Both breasts were examined the upright and supine position.  Both breasts exhibit normal shape and contour.  The right breast is status postmastectomy with changes of a TRAM flap noted.  There are no overlying skin changes or palpable subcutaneous masses noted.  The left breast has a well-healed curvilinear incision on the upper outer aspect of the nipple areolar complex and a well-healed transverse incision in the left axilla.  No overlying skin changes are noted, no palpable masses, no discharge from the nipple.  Abdominal:       General: There is no distension.      Palpations: Abdomen is soft.      Tenderness: There is no abdominal tenderness.      Hernia: No hernia is present.   Musculoskeletal:         General: Normal range of motion.      Cervical back: Normal range of motion.   Lymphadenopathy:      Cervical: No cervical adenopathy.      Upper Body:      Right upper body: No supraclavicular or axillary adenopathy.      Left upper body: No supraclavicular or axillary adenopathy.   Skin:     General: Skin is warm and dry.      Findings: No rash.   Neurological:      Mental Status: She is alert and oriented to person, place, and time.         Results Review:   Lab Results (last 24 hours)       ** No results found for the last 24 hours. **          No radiology results for the last day      I reviewed the patient's new imaging results and agree with the interpretation.  I reviewed the patient's other test results and agree with the interpretation    Assessment & Plan   Left breast cancer    Recommend continue to follow-up with medical oncology. They should continue to follow the patient with clinical breast exams and imaging.  Recommend continued clinical examinations by me every 6 months for 2 years following surgery.  May follow-up sooner if issues arise  Patient is aware that she has declined radiation therapy and endocrine therapy both of which may reduce the risk of recurrence in the future.    I discussed the patients findings and my recommendations with the patient.     Jaime De Anda MD  10/26/23  08:58 EDT

## 2023-10-26 NOTE — PROGRESS NOTES
HEMATOLOGY ONCOLOGY OUTPATIENT CONSULTATION       Patient name: Shasha Freeman  : 1945  MRN: [id number]  Primary Care Physician: Sherine Blackman MD  Referring Physician: Jaime De Anda*  Reason For Consult: Left estrogen receptor positive breast cancer.    History of Present Illness:  Patient is a 78 y.o.     10/30/2023: For the first time in the office to follow her for breast cancer.  In 2022 she underwent a screening mammogram. It reported a solid nodule that measured 9 x 8 x 6 mm in the posterior region of the left breast at the 11 o'clock position, approximately 8 cm from the nipple.  This was further confirmed with additional images and underwent a core biopsy which reported a well differentiated ductal carcinoma that was strongly ER positive at 100% of the cells and DC positive at 90% of the cells.  Her menarche was at the age of 15 and her first pregnancy at the age of 25.  She was pregnant 4 times and had 2 live births.  She never breast-fed.  She never received estrogen replacement therapy after menopause which happened at the age of 52 or 53.  She took oral contraceptives for a brief period of time. On 10/20/2022 she underwent an uncomplicated left breast lumpectomy. The final report of pathology was of moderately differentiated invasive carcinoma that measured 13 mm.  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  It had a cribriform pattern and grade 2 histologic grade.  All margins were clear.  The sentinel lymph node was negative. She was offered adjuvant radiation and hormonal manipulation with an aromatase inhibitor. She declined. Since the surgery she was followed by her surgeon and had not been found to have any suggestion of recurrent disease. In 2023 she underwent total abdominal hysterectomy Pathology reported adenomyosis and an endometrial polyp, but otherwise there was no significant abnormality. At the time of this visit  she was feeling well and had new symptoms. She had been as active as before. Her family history and social history were reviewed. On exam alert, conversant and not ill or in distress. No jaundice. No oral lesions and no palpable adenopathy. The lungs clear and heart regular. No edema. Discussed with her again the use of adjuvant hormonal manipulation. She  declined again. Given that she has had two bilateral asynchronous breast cancers, genetic counseling fidel be appropriate. She declined again. I asked to return to see me in 6 months with her next mammogram.     Subjective:  10/30/2023: In the office for the first time with the above.     Past Medical History:   Diagnosis Date    Breast cancer left. Underwent lumpectomy. No adjuvant therapy per her own choice 2023    Breast cancer right ER positive. Treated with mastectomy and no adjuvant therapy 1995    Dermatitis     ears    Herpes simplex     about every 5 years    Hypertension     Macular degeneration     right eye  minimal    Osteopenia     Peripheral edema     with travel at times     Past Surgical History:   Procedure Laterality Date    BREAST LUMPECTOMY Left 10/20/2022    Procedure: BREAST LUMPECTOMY;  Surgeon: Jaime De Anda MD;  Location: [facility] OR;  Service: General;  Laterality: Left;    BREAST RECONSTRUCTION Right     CATARACT EXTRACTION Bilateral     with IOL    HYSTERECTOMY  01/12/2023    SENTINEL NODE BIOPSY Left 10/20/2022    Procedure: SENTINEL NODE BIOPSY;  Surgeon: Jaime De Anda MD;  Location: [facility];  Service: General;  Laterality: Left;    SIMPLE MASTECTOMY Right     1995    TONSILLECTOMY      TOOTH EXTRACTION         Current Outpatient Medications:     amLODIPine (NORVASC) 5 MG tablet, Take 1 tablet by mouth Daily. Take preop, Disp: , Rfl:     aspirin 81 MG chewable tablet, Chew 1 tablet Daily. Ld  10/15, Disp: , Rfl:     Bystolic 10 MG tablet, Take 1 tablet by mouth Every Evening., Disp: , Rfl:      cholecalciferol (VITAMIN D3) 25 MCG (1000 UT) tablet, Take 1 tablet by mouth Every Evening. Ld 10/13, Disp: , Rfl:     cyclobenzaprine (FLEXERIL) 10 MG tablet, Take 1 tablet by mouth As Needed for Muscle Spasms. None preop, Disp: , Rfl:     estradiol (ESTRACE) 0.1 MG/GM vaginal cream, APPLY 1 GRAM VAGINALLY 2 NIGHTS A WEEK AS DIRECTED., Disp: , Rfl:     mometasone (ELOCON) 0.1 % cream, Apply 1 application  topically to the appropriate area as directed As Needed. None after bathing started, Disp: , Rfl:     Multiple Vitamins-Minerals (ICAPS AREDS 2 PO), Take 1 tablet by mouth 2 (Two) Times a Day. Ld 10/13, Disp: , Rfl:     multivitamin (THERAGRAN) tablet tablet, Take 1 tablet by mouth Daily. Ld 10/13, Disp: , Rfl:     spironolactone (ALDACTONE) 25 MG tablet, Take 1 tablet by mouth Daily As Needed. None am surgery, Disp: , Rfl:     Allergies   Allergen Reactions    Statins Myalgia    Acetaminophen Palpitations    Latex Rash     Family History   Problem Relation Age of Onset    Lymphoma Mother         Non-hodgkins lymphoma    Heart disease Father     Colon cancer Maternal Uncle        Cancer-related family history includes Colon cancer in her maternal uncle; Lymphoma in her mother.    Social History     Tobacco Use    Smoking status: Never     Passive exposure: Never    Smokeless tobacco: Never   Vaping Use    Vaping Use: Never used   Substance Use Topics    Alcohol use: Never    Drug use: Never     Social History     Social History Narrative    Not on file     ROS:   Review of Systems   Constitutional:  Negative for activity change, appetite change, chills, diaphoresis, fatigue, fever and unexpected weight change.   HENT:  Negative for congestion, dental problem, drooling, ear discharge, ear pain, facial swelling, hearing loss, mouth sores, nosebleeds, postnasal drip, rhinorrhea, sinus pressure, sinus pain, sneezing, sore throat, tinnitus, trouble swallowing and voice change.    Eyes:  Negative for photophobia, pain,  "discharge, redness, itching and visual disturbance.   Respiratory:  Negative for apnea, cough, choking, chest tightness, shortness of breath, wheezing and stridor.    Cardiovascular:  Negative for chest pain, palpitations and leg swelling.   Gastrointestinal:  Negative for abdominal distention, abdominal pain, anal bleeding, blood in stool, constipation, diarrhea, nausea, rectal pain and vomiting.   Endocrine: Negative for cold intolerance, heat intolerance, polydipsia and polyuria.   Genitourinary:  Negative for decreased urine volume, difficulty urinating, dysuria, flank pain, frequency, genital sores, hematuria and urgency.   Musculoskeletal:  Negative for arthralgias, back pain, gait problem, joint swelling, myalgias, neck pain and neck stiffness.   Skin:  Negative for color change, pallor and rash.   Neurological:  Negative for dizziness, tremors, seizures, syncope, facial asymmetry, speech difficulty, weakness, light-headedness, numbness and headaches.   Hematological:  Negative for adenopathy. Does not bruise/bleed easily.   Psychiatric/Behavioral:  Negative for agitation, behavioral problems, confusion, decreased concentration, hallucinations, self-injury, sleep disturbance and suicidal ideas. The patient is not nervous/anxious.      Objective:    Vital Signs:  Vitals:    10/30/23 0917   BP: 178/83   Pulse: 63   Temp: 97.9 °F (36.6 °C)   TempSrc: Oral   SpO2: 94%   Weight: 89.5 kg (197 lb 6.4 oz)   Height: 172.7 cm (68\")   PainSc: 0-No pain     Body mass index is 30.01 kg/m².    ECOG  (0) Fully active, able to carry on all predisease performance without restriction    Physical Exam:   Physical Exam  Constitutional:       General: She is not in acute distress.     Appearance: Normal appearance. She is not ill-appearing, toxic-appearing or diaphoretic.   HENT:      Head: Normocephalic and atraumatic.      Right Ear: External ear normal.      Left Ear: External ear normal.      Nose: Nose normal.      " Mouth/Throat:      Mouth: Mucous membranes are moist.      Pharynx: Oropharynx is clear. No oropharyngeal exudate or posterior oropharyngeal erythema.   Eyes:      General: No scleral icterus.        Right eye: No discharge.         Left eye: No discharge.      Conjunctiva/sclera: Conjunctivae normal.      Pupils: Pupils are equal, round, and reactive to light.   Cardiovascular:      Rate and Rhythm: Normal rate and regular rhythm.      Pulses: Normal pulses.      Heart sounds: No murmur heard.     No friction rub. No gallop.   Pulmonary:      Effort: No respiratory distress.      Breath sounds: No stridor. No wheezing, rhonchi or rales.   Abdominal:      General: Abdomen is flat. Bowel sounds are normal. There is no distension.      Palpations: Abdomen is soft. There is no mass.      Tenderness: There is no abdominal tenderness. There is no right CVA tenderness, left CVA tenderness, guarding or rebound.      Hernia: No hernia is present.   Musculoskeletal:         General: No swelling, tenderness, deformity or signs of injury.      Cervical back: No rigidity.      Right lower leg: No edema.      Left lower leg: No edema.   Lymphadenopathy:      Cervical: No cervical adenopathy.   Skin:     Coloration: Skin is not jaundiced.      Findings: No bruising, lesion or rash.   Neurological:      General: No focal deficit present.      Mental Status: She is alert and oriented to person, place, and time.      Cranial Nerves: No cranial nerve deficit.      Motor: No weakness.      Gait: Gait normal.   Psychiatric:         Mood and Affect: Mood normal.         Behavior: Behavior normal.         Thought Content: Thought content normal.         Judgment: Judgment normal.     ANGELA Rich MD performed the physical exam on 10/30/2023 as documented above.     Lab Results - Last 18 Months   Lab Units 10/30/23  0918 01/13/23  0403 01/03/23  1138   WBC 10*3/mm3 6.13 13.70* 5.83   HEMOGLOBIN g/dL 14.1 12.0 14.3   HEMATOCRIT % 42.9  36.4 43.0   PLATELETS 10*3/mm3 224 222 268   MCV fL 92.1 88.7 89.8     Lab Results - Last 18 Months   Lab Units 01/03/23  1138   SODIUM mmol/L 143   POTASSIUM mmol/L 4.0   CHLORIDE mmol/L 104   CO2 mmol/L 29.7*   BUN mg/dL 11   CREATININE mg/dL 0.81   CALCIUM mg/dL 9.4   GLUCOSE mg/dL 96     Lab Results   Component Value Date    GLUCOSE 96 01/03/2023    BUN 11 01/03/2023    CREATININE 0.81 01/03/2023    BCR 13.6 01/03/2023    K 4.0 01/03/2023    CO2 29.7 (H) 01/03/2023    CALCIUM 9.4 01/03/2023     Assessment & Plan     [id number] moderately differentiated ER, IA positive, Her2 negative left breast cancer. Underwent lumpectomy. Declines any additional treatment. Declines genetic counseling. No suggestion of recurrent disease at this time. She is to see me in approximately 6 months with a new screening mammogram of the left breast and laboratory exams.   History of right breast cancer.   Hypertension.  Reviewed all the records, including all notes from Dr. Cooney's office and the [facility] system.Reviewed all the laboratory exams an all reports of pathology.     Kwasi Rich MD on 10/30/2023 at 12:52 PM.

## 2023-10-30 ENCOUNTER — LAB (OUTPATIENT)
Dept: LAB | Facility: HOSPITAL | Age: 78
End: 2023-10-30
Payer: MEDICARE

## 2023-10-30 ENCOUNTER — CONSULT (OUTPATIENT)
Dept: ONCOLOGY | Facility: CLINIC | Age: 78
End: 2023-10-30
Payer: MEDICARE

## 2023-10-30 VITALS
OXYGEN SATURATION: 94 % | DIASTOLIC BLOOD PRESSURE: 83 MMHG | BODY MASS INDEX: 29.92 KG/M2 | TEMPERATURE: 97.9 F | HEIGHT: 68 IN | SYSTOLIC BLOOD PRESSURE: 178 MMHG | HEART RATE: 63 BPM | WEIGHT: 197.4 LBS

## 2023-10-30 DIAGNOSIS — C50.212 MALIGNANT NEOPLASM OF UPPER-INNER QUADRANT OF LEFT BREAST IN FEMALE, ESTROGEN RECEPTOR POSITIVE: ICD-10-CM

## 2023-10-30 DIAGNOSIS — Z17.0 MALIGNANT NEOPLASM OF UPPER-INNER QUADRANT OF LEFT BREAST IN FEMALE, ESTROGEN RECEPTOR POSITIVE: ICD-10-CM

## 2023-10-30 DIAGNOSIS — C50.212 MALIGNANT NEOPLASM OF UPPER-INNER QUADRANT OF LEFT BREAST IN FEMALE, ESTROGEN RECEPTOR POSITIVE: Primary | ICD-10-CM

## 2023-10-30 DIAGNOSIS — Z17.0 MALIGNANT NEOPLASM OF UPPER-INNER QUADRANT OF LEFT BREAST IN FEMALE, ESTROGEN RECEPTOR POSITIVE: Primary | ICD-10-CM

## 2023-10-30 LAB
BASOPHILS # BLD AUTO: 0.03 10*3/MM3 (ref 0–0.2)
BASOPHILS NFR BLD AUTO: 0.5 % (ref 0–1.5)
DEPRECATED RDW RBC AUTO: 44.6 FL (ref 37–54)
EOSINOPHIL # BLD AUTO: 0.15 10*3/MM3 (ref 0–0.4)
EOSINOPHIL NFR BLD AUTO: 2.4 % (ref 0.3–6.2)
ERYTHROCYTE [DISTWIDTH] IN BLOOD BY AUTOMATED COUNT: 13.5 % (ref 12.3–15.4)
HCT VFR BLD AUTO: 42.9 % (ref 34–46.6)
HGB BLD-MCNC: 14.1 G/DL (ref 12–15.9)
LYMPHOCYTES # BLD AUTO: 1.62 10*3/MM3 (ref 0.7–3.1)
LYMPHOCYTES NFR BLD AUTO: 26.4 % (ref 19.6–45.3)
MCH RBC QN AUTO: 30.3 PG (ref 26.6–33)
MCHC RBC AUTO-ENTMCNC: 32.9 G/DL (ref 31.5–35.7)
MCV RBC AUTO: 92.1 FL (ref 79–97)
MONOCYTES # BLD AUTO: 0.54 10*3/MM3 (ref 0.1–0.9)
MONOCYTES NFR BLD AUTO: 8.8 % (ref 5–12)
NEUTROPHILS NFR BLD AUTO: 3.79 10*3/MM3 (ref 1.7–7)
NEUTROPHILS NFR BLD AUTO: 61.9 % (ref 42.7–76)
PLATELET # BLD AUTO: 224 10*3/MM3 (ref 140–450)
PMV BLD AUTO: 10 FL (ref 6–12)
RBC # BLD AUTO: 4.66 10*6/MM3 (ref 3.77–5.28)
WBC NRBC COR # BLD: 6.13 10*3/MM3 (ref 3.4–10.8)

## 2023-10-30 PROCEDURE — 1126F AMNT PAIN NOTED NONE PRSNT: CPT | Performed by: INTERNAL MEDICINE

## 2023-10-30 PROCEDURE — 99204 OFFICE O/P NEW MOD 45 MIN: CPT | Performed by: INTERNAL MEDICINE

## 2023-10-30 PROCEDURE — 1159F MED LIST DOCD IN RCRD: CPT | Performed by: INTERNAL MEDICINE

## 2023-10-30 PROCEDURE — 85025 COMPLETE CBC W/AUTO DIFF WBC: CPT

## 2023-10-30 PROCEDURE — 3077F SYST BP >= 140 MM HG: CPT | Performed by: INTERNAL MEDICINE

## 2023-10-30 PROCEDURE — 1160F RVW MEDS BY RX/DR IN RCRD: CPT | Performed by: INTERNAL MEDICINE

## 2023-10-30 PROCEDURE — 3079F DIAST BP 80-89 MM HG: CPT | Performed by: INTERNAL MEDICINE

## 2023-11-01 ENCOUNTER — PATIENT ROUNDING (BHMG ONLY) (OUTPATIENT)
Dept: ONCOLOGY | Facility: CLINIC | Age: 78
End: 2023-11-01
Payer: MEDICARE

## 2023-11-01 NOTE — PROGRESS NOTES
November 1, 2023    Hello, may I speak with Shasha Freeman?    My name is Bhavani Soria      I am  with [facility]  [address] [place] [address] [phone].    Before we get started may I verify your date of birth? 1945    I am calling to officially welcome you to our practice and ask about your recent visit. Is this a good time to talk? no    Tell me about your visit with us. What things went well?  A My Chart message was sent to the patient.        We're always looking for ways to make our patients' experiences even better. Do you have recommendations on ways we may improve?  no    Overall were you satisfied with your first visit to our practice? yes       I appreciate you taking the time to speak with me today. Is there anything else I can do for you? no      Thank you, and have a great day.

## 2024-03-25 ENCOUNTER — TELEPHONE (OUTPATIENT)
Dept: ONCOLOGY | Facility: CLINIC | Age: 79
End: 2024-03-25
Payer: MEDICARE

## 2024-03-25 DIAGNOSIS — Z17.0 MALIGNANT NEOPLASM OF UPPER-INNER QUADRANT OF LEFT BREAST IN FEMALE, ESTROGEN RECEPTOR POSITIVE: Primary | ICD-10-CM

## 2024-03-25 DIAGNOSIS — N95.9 MENOPAUSAL AND POSTMENOPAUSAL DISORDER: ICD-10-CM

## 2024-03-25 DIAGNOSIS — Z12.31 ENCOUNTER FOR SCREENING MAMMOGRAM FOR MALIGNANT NEOPLASM OF BREAST: ICD-10-CM

## 2024-03-25 DIAGNOSIS — C50.212 MALIGNANT NEOPLASM OF UPPER-INNER QUADRANT OF LEFT BREAST IN FEMALE, ESTROGEN RECEPTOR POSITIVE: Primary | ICD-10-CM

## 2024-03-25 NOTE — TELEPHONE ENCOUNTER
Caller: Shasha Freeman    Relationship: Self    Best call back number: [phone]     What is the best time to reach you: ASAP    Who are you requesting to speak with (clinical staff, provider,  specific staff member): CLINICAL      What was the call regarding: PLEASE CALL PT TO LET HER KNOW IF DR RAMIREZ WILL BE ORDERING MAMMOGRAM AND BONE SCAN FOR HER, AND IF THEY WILL NEED TO BE DONE PRIOR TO HER FOLLOW UP [facility] ON 4/29.

## 2024-03-26 NOTE — TELEPHONE ENCOUNTER
AFTER SPEAKING WITH DR. RAMIREZ, I CONTACTED MRS. MAYER. I INFORMED THE PATIENT, PER DR. RAMIREZ, HE DOES WANT HER TO HAVE A MAMMOGRAM AND DEXA SCAN COMPLETED PRIOR TO HER FOLLOW UP. PATIENT VOICED UNDERSTANDING. I STATED THE ORDERS HAVE BEEN PLACED THEREFORE OUR [facility] WILL CONTACT HER TO GET THE SCANS SCHEDULED. I PROVIDED HER WITH THE PHONE NUMBER TO [facility]EN[facility] SCH[facility] AND ADVISED HER TO CONTACT THEM IF SHE IS NOT CONTACTED WITHIN THE NEXT WEEK. SHE VOICED UNDERSTANDING. I ADVISED HER TO CONTACT OUR OFFICE IF SHE HAS ANY QUESTIONS OR CONCERNS; SHE CONFIRMED.     PER DR. RAMIREZ, ORDERS PLACED FOR DEXA BONE DENSITY SCAN AND MAMMOGRAM SCREENING OF LEFT BREAST.

## 2024-04-03 ENCOUNTER — TELEPHONE (OUTPATIENT)
Dept: ONCOLOGY | Facility: CLINIC | Age: 79
End: 2024-04-03
Payer: MEDICARE

## 2024-04-03 NOTE — TELEPHONE ENCOUNTER
Caller: Shasha Freeman    Relationship: Self    Best call back number:     [phone]     What is the best time to reach you: ANYTIME    Who are you requesting to speak with (clinical staff, provider,  specific staff member): NON-CLINICAL    What was the call regarding: PT CALLED TO SCHEDULED HER MAMMOGRAM AND THEY ADVISED HER THAT IT WAS IN AS A SCREENING MAMMOGRAM AND NOT A DIAGNOSTIC MAMMOGRAM. PT THINKS THAT SHOULD BE CHANGED TO DIAGNOSTIC AND WOULD LIKE TO CHECK ON THIS?    Is it okay if the provider responds through [facility]: NO - PLEASE CALL BACK TO DISCUSS.

## 2024-04-03 NOTE — TELEPHONE ENCOUNTER
"Contacted the patient regarding her message we received. I informed her that Dr. Rich does want her to have a screening mammogram rather than diagnostic mammogram therefore the order is correct. She stated she was previously informed by Dr. De Anda that her mammograms need to be diagnostic therefore she asked if we checked with Dr. De Anda. I stated that we did not check with Dr. De Anda however, I informed her that the results from her last diagnostic mammogram were normal therefore it states \"recommend annual screening mammography\" which is why the screening mammogram was ordered. She confirmed and asked if she needs to contact someone to schedule the appt or if we schedule it; I provided her with the phone number to central scheduling. She confirmed. She asked if the DEXA scan can be completed at the same time/day of the mammogram; I advised her to request the scans to be scheduled on the same day when she speaks with central scheduling and they will be able to tell her if the schedules are available to accommodate her request; she voiced understanding. She asked if the DEXA scan is extensive because her friend told her it was painful; I informed her that the DEXA scan is not extensive and should not be painful therefore I am unaware if her friend was referring to a different scan or not. She confirmed. I advised her to contact our office if she has any further questions or concerns. She voiced understanding.   "

## 2024-04-22 ENCOUNTER — HOSPITAL ENCOUNTER (OUTPATIENT)
Dept: BONE DENSITY | Facility: HOSPITAL | Age: 79
Discharge: HOME | End: 2024-04-22
Payer: MEDICARE

## 2024-04-22 ENCOUNTER — HOSPITAL ENCOUNTER (OUTPATIENT)
Dept: MAMMOGRAPHY | Facility: HOSPITAL | Age: 79
Discharge: HOME | End: 2024-04-22
Payer: MEDICARE

## 2024-04-22 DIAGNOSIS — Z12.31 ENCOUNTER FOR SCREENING MAMMOGRAM FOR MALIGNANT NEOPLASM OF BREAST: ICD-10-CM

## 2024-04-22 DIAGNOSIS — C50.212 MALIGNANT NEOPLASM OF UPPER-INNER QUADRANT OF LEFT BREAST IN FEMALE, ESTROGEN RECEPTOR POSITIVE: ICD-10-CM

## 2024-04-22 DIAGNOSIS — Z17.0 MALIGNANT NEOPLASM OF UPPER-INNER QUADRANT OF LEFT BREAST IN FEMALE, ESTROGEN RECEPTOR POSITIVE: ICD-10-CM

## 2024-04-22 DIAGNOSIS — N95.9 MENOPAUSAL AND POSTMENOPAUSAL DISORDER: ICD-10-CM

## 2024-04-22 PROCEDURE — 77080 DXA BONE DENSITY AXIAL: CPT

## 2024-04-22 PROCEDURE — 77067 SCR MAMMO BI INCL CAD: CPT

## 2024-04-22 PROCEDURE — 77063 BREAST TOMOSYNTHESIS BI: CPT

## 2024-04-26 NOTE — PROGRESS NOTES
HEMATOLOGY ONCOLOGY OUTPATIENT FOLLOW-UP      Patient name: Shasha Freeman  : 1945  MRN: [id number]  Primary Care Physician: Sherine Blackman MD  Referring Physician: No ref. provider found  Reason For Consult: Left estrogen receptor positive breast cancer.    History of Present Illness:  Patient is a 79 y.o.     10/30/2023: For the first time in the office to follow her for breast cancer.  In 2022 she underwent a screening mammogram. It reported a solid nodule that measured 9 x 8 x 6 mm in the posterior region of the left breast at the 11 o'clock position, approximately 8 cm from the nipple.  This was further confirmed with additional images and underwent a core biopsy which reported a well differentiated ductal carcinoma that was strongly ER positive at 100% of the cells and TN positive at 90% of the cells.  Her menarche was at the age of 15 and her first pregnancy at the age of 25.  She was pregnant 4 times and had 2 live births.  She never breast-fed.  She never received estrogen replacement therapy after menopause which happened at the age of 52 or 53.  She took oral contraceptives for a brief period of time. On 10/20/2022 she underwent an uncomplicated left breast lumpectomy. The final report of pathology was of moderately differentiated invasive carcinoma that measured 13 mm.  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  It had a cribriform pattern and grade 2 histologic grade.  All margins were clear.  The sentinel lymph node was negative. She was offered adjuvant radiation and hormonal manipulation with an aromatase inhibitor. She declined. Since the surgery she was followed by her surgeon and had not been found to have any suggestion of recurrent disease. In 2023 she underwent total abdominal hysterectomy Pathology reported adenomyosis and an endometrial polyp, but otherwise there was no significant abnormality. At the time of this visit she was  feeling well and had new symptoms. She had been as active as before. Her family history and social history were reviewed. On exam alert, conversant and not ill or in distress. No jaundice. No oral lesions and no palpable adenopathy. The lungs clear and heart regular. No edema. Discussed with her again the use of adjuvant hormonal manipulation. She  declined again. Given that she has had two bilateral asynchronous breast cancers, genetic counseling fidel be appropriate. She declined again. I asked to return to see me in 6 months with her next mammogram.     4/29/2024: Without new complaints.  As active as before.  He eating as well.  No fevers or unintended weight loss.  No new pain and no dyspnea, diarrhea or dysuria.  On exam alert, conversant and well-oriented.  The lungs are clear bilaterally and the heart is regular.  No palpable cervical, supraclavicular or axillary adenopathy.  Abdomen soft and nontender.  Liver and spleen not enlarged.  No edema.  Reviewed the mammogram and discussed with her.  Reviewed the laboratory exams and discussed with her.  Awaiting the chemistry.  No change at this time.  She is to see me in 6 months.    Past Medical History:   Diagnosis Date    Abnormal ECG     Murmur    Breast cancer left. Underwent lumpectomy. No adjuvant therapy per her own choice 2023    Breast cancer right ER positive. Treated with mastectomy and no adjuvant therapy 1995    Cholelithiasis     Didn’t need surgery    Colon polyp Years ago    Had removed    Dermatitis     ears    Diverticulitis of colon     Herpes simplex     about every 5 years    Hypertension     Macular degeneration     right eye  minimal    Osteopenia     Peripheral edema     with travel at times    Pulmonary arterial hypertension     Had for years     Past Surgical History:   Procedure Laterality Date    BREAST BIOPSY      BREAST LUMPECTOMY Left 10/20/2022    Procedure: BREAST LUMPECTOMY;  Surgeon: Jaime De Anda MD;  Location:   [facility] MAIN OR;  Service: General;  Laterality: Left;    BREAST RECONSTRUCTION Right     CATARACT EXTRACTION Bilateral     with IOL    HYSTERECTOMY  01/12/2023    SENTINEL NODE BIOPSY Left 10/20/2022    Procedure: SENTINEL NODE BIOPSY;  Surgeon: Jaime De Anda MD;  Location: [facility] MAIN OR;  Service: General;  Laterality: Left;    SIMPLE MASTECTOMY Right     1995    TONSILLECTOMY      TOOTH EXTRACTION         Current Outpatient Medications:     amLODIPine (NORVASC) 5 MG tablet, Take 1 tablet by mouth Daily. Take preop, Disp: , Rfl:     aspirin 81 MG chewable tablet, Chew 1 tablet Daily. Ld  10/15, Disp: , Rfl:     Bystolic 10 MG tablet, Take 1 tablet by mouth Every Evening., Disp: , Rfl:     cholecalciferol (VITAMIN D3) 25 MCG (1000 UT) tablet, Take 1 tablet by mouth Every Evening. Ld 10/13, Disp: , Rfl:     estradiol (ESTRACE) 0.1 MG/GM vaginal cream, APPLY 1 GRAM VAGINALLY 2 NIGHTS A WEEK AS DIRECTED., Disp: , Rfl:     mometasone (ELOCON) 0.1 % cream, Apply 1 Application topically to the appropriate area as directed As Needed. None after bathing started, Disp: , Rfl:     Multiple Vitamins-Minerals (ICAPS AREDS 2 PO), Take 1 tablet by mouth 2 (Two) Times a Day. Ld 10/13, Disp: , Rfl:     multivitamin (THERAGRAN) tablet tablet, Take 1 tablet by mouth Daily. Ld 10/13, Disp: , Rfl:     spironolactone (ALDACTONE) 25 MG tablet, Take 1 tablet by mouth Daily As Needed. None am surgery (Patient not taking: Reported on 4/29/2024), Disp: , Rfl:     Allergies   Allergen Reactions    Statins Myalgia    Acetaminophen Palpitations    Latex Rash     Family History   Problem Relation Age of Onset    Lymphoma Mother         Non-hodgkins lymphoma    Heart disease Father     Colon cancer Maternal Uncle        Cancer-related family history includes Colon cancer in her maternal uncle; Lymphoma in her mother.    Social History     Tobacco Use    Smoking status: Never     Passive exposure: Never    Smokeless tobacco: Never    Vaping Use    Vaping status: Never Used   Substance Use Topics    Alcohol use: Never    Drug use: Never     Social History     Social History Narrative    Not on file     ROS:   Review of Systems   Constitutional:  Negative for activity change, appetite change, chills, diaphoresis, fatigue, fever and unexpected weight change.   HENT:  Negative for congestion, dental problem, drooling, ear discharge, ear pain, facial swelling, hearing loss, mouth sores, nosebleeds, postnasal drip, rhinorrhea, sinus pressure, sinus pain, sneezing, sore throat, tinnitus, trouble swallowing and voice change.    Eyes:  Negative for photophobia, pain, discharge, redness, itching and visual disturbance.   Respiratory:  Negative for apnea, cough, choking, chest tightness, shortness of breath, wheezing and stridor.    Cardiovascular:  Negative for chest pain, palpitations and leg swelling.   Gastrointestinal:  Negative for abdominal distention, abdominal pain, anal bleeding, blood in stool, constipation, diarrhea, nausea, rectal pain and vomiting.   Endocrine: Negative for cold intolerance, heat intolerance, polydipsia and polyuria.   Genitourinary:  Negative for decreased urine volume, difficulty urinating, dysuria, flank pain, frequency, genital sores, hematuria and urgency.   Musculoskeletal:  Negative for arthralgias, back pain, gait problem, joint swelling, myalgias, neck pain and neck stiffness.   Skin:  Negative for color change, pallor and rash.   Neurological:  Negative for dizziness, tremors, seizures, syncope, facial asymmetry, speech difficulty, weakness, light-headedness, numbness and headaches.   Hematological:  Negative for adenopathy. Does not bruise/bleed easily.   Psychiatric/Behavioral:  Negative for agitation, behavioral problems, confusion, decreased concentration, hallucinations, self-injury, sleep disturbance and suicidal ideas. The patient is not nervous/anxious.      Objective:    Vital Signs:  Vitals:    04/29/24  "0913   BP: 145/86   Pulse: 59   Temp: 97.5 °F (36.4 °C)   TempSrc: Oral   SpO2: 94%   Weight: 89.4 kg (197 lb)   Height: 172.7 cm (68\")   PainSc: 0-No pain     Body mass index is 29.95 kg/m².    ECOG  (0) Fully active, able to carry on all predisease performance without restriction    Physical Exam:   Physical Exam  Constitutional:       General: She is not in acute distress.     Appearance: Normal appearance. She is not ill-appearing, toxic-appearing or diaphoretic.   HENT:      Head: Normocephalic and atraumatic.      Right Ear: External ear normal.      Left Ear: External ear normal.      Nose: Nose normal.      Mouth/Throat:      Mouth: Mucous membranes are moist.      Pharynx: Oropharynx is clear. No oropharyngeal exudate or posterior oropharyngeal erythema.   Eyes:      General: No scleral icterus.        Right eye: No discharge.         Left eye: No discharge.      Conjunctiva/sclera: Conjunctivae normal.      Pupils: Pupils are equal, round, and reactive to light.   Cardiovascular:      Rate and Rhythm: Normal rate and regular rhythm.      Pulses: Normal pulses.      Heart sounds: No murmur heard.     No friction rub. No gallop.   Pulmonary:      Effort: No respiratory distress.      Breath sounds: No stridor. No wheezing, rhonchi or rales.   Abdominal:      General: Abdomen is flat. Bowel sounds are normal. There is no distension.      Palpations: Abdomen is soft. There is no mass.      Tenderness: There is no abdominal tenderness. There is no right CVA tenderness, left CVA tenderness, guarding or rebound.      Hernia: No hernia is present.   Musculoskeletal:         General: No swelling, tenderness, deformity or signs of injury.      Cervical back: No rigidity.      Right lower leg: No edema.      Left lower leg: No edema.   Lymphadenopathy:      Cervical: No cervical adenopathy.   Skin:     Coloration: Skin is not jaundiced.      Findings: No bruising, lesion or rash.   Neurological:      General: No " focal deficit present.      Mental Status: She is alert and oriented to person, place, and time.      Cranial Nerves: No cranial nerve deficit.      Motor: No weakness.      Gait: Gait normal.   Psychiatric:         Mood and Affect: Mood normal.         Behavior: Behavior normal.         Thought Content: Thought content normal.         Judgment: Judgment normal.     ANGELA Rich MD performed the physical exam on 4/29/2024 as documented above.    Lab Results - Last 18 Months   Lab Units 04/29/24  0901 10/30/23  0918 01/13/23  0403   WBC 10*3/mm3 6.44 6.13 13.70*   HEMOGLOBIN g/dL 13.6 14.1 12.0   HEMATOCRIT % 42.7 42.9 36.4   PLATELETS 10*3/mm3 224 224 222   MCV fL 93.2 92.1 88.7     Lab Results - Last 18 Months   Lab Units 01/03/23  1138   SODIUM mmol/L 143   POTASSIUM mmol/L 4.0   CHLORIDE mmol/L 104   CO2 mmol/L 29.7*   BUN mg/dL 11   CREATININE mg/dL 0.81   CALCIUM mg/dL 9.4   GLUCOSE mg/dL 96     Lab Results   Component Value Date    GLUCOSE 96 01/03/2023    BUN 11 01/03/2023    CREATININE 0.81 01/03/2023    BCR 13.6 01/03/2023    K 4.0 01/03/2023    CO2 29.7 (H) 01/03/2023    CALCIUM 9.4 01/03/2023     Assessment & Plan     [id number] moderately differentiated ER, KS positive, Her2 negative left breast cancer. Underwent lumpectomy. Declines any additional treatment. Declines genetic counseling.  No suggestion of recurrent disease at this time.  She is to see me in 6 months.  History of right breast cancer.   Hypertension.  Reviewed all laboratory exams.  She will return to see me in approximately 6 months.    Kwasi Rich MD on 4/29/2024 at 1001 a.m.

## 2024-04-29 ENCOUNTER — LAB (OUTPATIENT)
Dept: LAB | Facility: HOSPITAL | Age: 79
End: 2024-04-29
Payer: MEDICARE

## 2024-04-29 ENCOUNTER — OFFICE VISIT (OUTPATIENT)
Dept: ONCOLOGY | Facility: CLINIC | Age: 79
End: 2024-04-29
Payer: MEDICARE

## 2024-04-29 ENCOUNTER — OFFICE VISIT (OUTPATIENT)
Dept: SURGERY | Facility: CLINIC | Age: 79
End: 2024-04-29
Payer: MEDICARE

## 2024-04-29 VITALS
SYSTOLIC BLOOD PRESSURE: 145 MMHG | BODY MASS INDEX: 29.86 KG/M2 | OXYGEN SATURATION: 94 % | DIASTOLIC BLOOD PRESSURE: 86 MMHG | HEIGHT: 68 IN | TEMPERATURE: 97.5 F | HEART RATE: 59 BPM | WEIGHT: 197 LBS

## 2024-04-29 VITALS
OXYGEN SATURATION: 95 % | TEMPERATURE: 97.7 F | RESPIRATION RATE: 16 BRPM | SYSTOLIC BLOOD PRESSURE: 159 MMHG | HEIGHT: 68 IN | HEART RATE: 66 BPM | WEIGHT: 196.2 LBS | DIASTOLIC BLOOD PRESSURE: 73 MMHG | BODY MASS INDEX: 29.73 KG/M2

## 2024-04-29 DIAGNOSIS — Z17.0 MALIGNANT NEOPLASM OF LOWER-INNER QUADRANT OF LEFT BREAST IN FEMALE, ESTROGEN RECEPTOR POSITIVE: Primary | ICD-10-CM

## 2024-04-29 DIAGNOSIS — C50.212 MALIGNANT NEOPLASM OF UPPER-INNER QUADRANT OF LEFT BREAST IN FEMALE, ESTROGEN RECEPTOR POSITIVE: Primary | ICD-10-CM

## 2024-04-29 DIAGNOSIS — Z17.0 MALIGNANT NEOPLASM OF UPPER-INNER QUADRANT OF LEFT BREAST IN FEMALE, ESTROGEN RECEPTOR POSITIVE: ICD-10-CM

## 2024-04-29 DIAGNOSIS — C50.312 MALIGNANT NEOPLASM OF LOWER-INNER QUADRANT OF LEFT BREAST IN FEMALE, ESTROGEN RECEPTOR POSITIVE: Primary | ICD-10-CM

## 2024-04-29 DIAGNOSIS — Z17.0 MALIGNANT NEOPLASM OF UPPER-INNER QUADRANT OF LEFT BREAST IN FEMALE, ESTROGEN RECEPTOR POSITIVE: Primary | ICD-10-CM

## 2024-04-29 DIAGNOSIS — C50.212 MALIGNANT NEOPLASM OF UPPER-INNER QUADRANT OF LEFT BREAST IN FEMALE, ESTROGEN RECEPTOR POSITIVE: ICD-10-CM

## 2024-04-29 LAB
ALBUMIN SERPL-MCNC: 4 G/DL (ref 3.5–5.2)
ALBUMIN/GLOB SERPL: 1.3 G/DL
ALP SERPL-CCNC: 151 U/L (ref 39–117)
ALT SERPL W P-5'-P-CCNC: 22 U/L (ref 1–33)
ANION GAP SERPL CALCULATED.3IONS-SCNC: 8 MMOL/L (ref 5–15)
AST SERPL-CCNC: 21 U/L (ref 1–32)
BASOPHILS # BLD AUTO: 0.05 10*3/MM3 (ref 0–0.2)
BASOPHILS NFR BLD AUTO: 0.8 % (ref 0–1.5)
BILIRUB SERPL-MCNC: 0.4 MG/DL (ref 0–1.2)
BUN SERPL-MCNC: 14 MG/DL (ref 8–23)
BUN/CREAT SERPL: 17.1 (ref 7–25)
CALCIUM SPEC-SCNC: 9.3 MG/DL (ref 8.6–10.5)
CHLORIDE SERPL-SCNC: 104 MMOL/L (ref 98–107)
CO2 SERPL-SCNC: 30 MMOL/L (ref 22–29)
CREAT SERPL-MCNC: 0.82 MG/DL (ref 0.57–1)
DEPRECATED RDW RBC AUTO: 46 FL (ref 37–54)
EGFRCR SERPLBLD CKD-EPI 2021: 73.8 ML/MIN/1.73
EOSINOPHIL # BLD AUTO: 0.17 10*3/MM3 (ref 0–0.4)
EOSINOPHIL NFR BLD AUTO: 2.6 % (ref 0.3–6.2)
ERYTHROCYTE [DISTWIDTH] IN BLOOD BY AUTOMATED COUNT: 13.8 % (ref 12.3–15.4)
GLOBULIN UR ELPH-MCNC: 3.2 GM/DL
GLUCOSE SERPL-MCNC: 103 MG/DL (ref 65–99)
HCT VFR BLD AUTO: 42.7 % (ref 34–46.6)
HGB BLD-MCNC: 13.6 G/DL (ref 12–15.9)
HOLD SPECIMEN: NORMAL
LYMPHOCYTES # BLD AUTO: 1.58 10*3/MM3 (ref 0.7–3.1)
LYMPHOCYTES NFR BLD AUTO: 24.5 % (ref 19.6–45.3)
MCH RBC QN AUTO: 29.7 PG (ref 26.6–33)
MCHC RBC AUTO-ENTMCNC: 31.9 G/DL (ref 31.5–35.7)
MCV RBC AUTO: 93.2 FL (ref 79–97)
MONOCYTES # BLD AUTO: 0.63 10*3/MM3 (ref 0.1–0.9)
MONOCYTES NFR BLD AUTO: 9.8 % (ref 5–12)
NEUTROPHILS NFR BLD AUTO: 4.01 10*3/MM3 (ref 1.7–7)
NEUTROPHILS NFR BLD AUTO: 62.3 % (ref 42.7–76)
PLATELET # BLD AUTO: 224 10*3/MM3 (ref 140–450)
PMV BLD AUTO: 9.7 FL (ref 6–12)
POTASSIUM SERPL-SCNC: 3.7 MMOL/L (ref 3.5–5.2)
PROT SERPL-MCNC: 7.2 G/DL (ref 6–8.5)
RBC # BLD AUTO: 4.58 10*6/MM3 (ref 3.77–5.28)
SODIUM SERPL-SCNC: 142 MMOL/L (ref 136–145)
WBC NRBC COR # BLD AUTO: 6.44 10*3/MM3 (ref 3.4–10.8)

## 2024-04-29 PROCEDURE — 1160F RVW MEDS BY RX/DR IN RCRD: CPT | Performed by: SURGERY

## 2024-04-29 PROCEDURE — 85025 COMPLETE CBC W/AUTO DIFF WBC: CPT

## 2024-04-29 PROCEDURE — 1159F MED LIST DOCD IN RCRD: CPT | Performed by: INTERNAL MEDICINE

## 2024-04-29 PROCEDURE — 1159F MED LIST DOCD IN RCRD: CPT | Performed by: SURGERY

## 2024-04-29 PROCEDURE — 3077F SYST BP >= 140 MM HG: CPT | Performed by: INTERNAL MEDICINE

## 2024-04-29 PROCEDURE — 99213 OFFICE O/P EST LOW 20 MIN: CPT | Performed by: SURGERY

## 2024-04-29 PROCEDURE — 36415 COLL VENOUS BLD VENIPUNCTURE: CPT

## 2024-04-29 PROCEDURE — 3079F DIAST BP 80-89 MM HG: CPT | Performed by: INTERNAL MEDICINE

## 2024-04-29 PROCEDURE — 1126F AMNT PAIN NOTED NONE PRSNT: CPT | Performed by: INTERNAL MEDICINE

## 2024-04-29 PROCEDURE — 1160F RVW MEDS BY RX/DR IN RCRD: CPT | Performed by: INTERNAL MEDICINE

## 2024-04-29 PROCEDURE — 3077F SYST BP >= 140 MM HG: CPT | Performed by: SURGERY

## 2024-04-29 PROCEDURE — 99213 OFFICE O/P EST LOW 20 MIN: CPT | Performed by: INTERNAL MEDICINE

## 2024-04-29 PROCEDURE — 3078F DIAST BP <80 MM HG: CPT | Performed by: SURGERY

## 2024-04-29 PROCEDURE — 80053 COMPREHEN METABOLIC PANEL: CPT | Performed by: INTERNAL MEDICINE

## 2024-04-29 NOTE — PROGRESS NOTES
GENERAL SURGERY ESTABLISHED PATIENT NOTE    Patient Care Team:  Sherine Blackman MD as PCP - General (Family Medicine)  Jaime De Anda MD as Surgeon (General Surgery)  Kwasi Rich MD as Consulting Physician (Hematology and Oncology)    Reason for follow-up: 6-month follow-up from left lumpectomy and sentinel lymph node biopsy    Subjective     Patient is a 79 y.o. female presents for 6-month follow-up after undergoing a left lumpectomy and sentinel lymph node biopsy on 10/20/2022.  The patient's pathology demonstrated residual moderately differentiated ductal carcinoma measuring 1.3 cm completely excised and 1 sentinel lymph node which was negative for malignancy.  The patient was seen by radiation oncology postoperatively and declined radiation therapy.  She was also seen by medical oncology and declined endocrine therapy. She reportsthat she and her  spent 2 months living in [place] with her daughter this past winter which was jose f. She denies having any significant pains and is pleased with her cosmetic outcome. She recently had a screening mammogram on the left breast (the right is surgically absent and reconstructed with a TRAM flap) which was benign.   Her past medical history is significant for breast cancer on the right side which was treated with a mastectomy and a TRAM flap many years ago.    Review of Systems   Genitourinary:  Negative for breast discharge, breast lump and breast pain.   Hematological:  Negative for adenopathy.        History  Past Medical History:   Diagnosis Date    Abnormal ECG     Murmur    Breast cancer left. Underwent lumpectomy. No adjuvant therapy per her own choice 2023    Breast cancer right ER positive. Treated with mastectomy and no adjuvant therapy 1995    Cholelithiasis     Didn’t need surgery    Colon polyp Years ago    Had removed    Dermatitis     ears    Diverticulitis of colon     Herpes simplex     about every 5 years    Hypertension      Macular degeneration     right eye  minimal    Osteopenia     Peripheral edema     with travel at times    Pulmonary arterial hypertension     Had for years     Past Surgical History:   Procedure Laterality Date    BREAST BIOPSY      BREAST LUMPECTOMY Left 10/20/2022    Procedure: BREAST LUMPECTOMY;  Surgeon: Jaime De Anda MD;  Location: [facility] MAIN OR;  Service: General;  Laterality: Left;    BREAST RECONSTRUCTION Right     CATARACT EXTRACTION Bilateral     with IOL    HYSTERECTOMY  01/12/2023    SENTINEL NODE BIOPSY Left 10/20/2022    Procedure: SENTINEL NODE BIOPSY;  Surgeon: Jaime De Anda MD;  Location: [facility] MAIN OR;  Service: General;  Laterality: Left;    SIMPLE MASTECTOMY Right     1995    TONSILLECTOMY      TOOTH EXTRACTION       Family History   Problem Relation Age of Onset    Lymphoma Mother         Non-hodgkins lymphoma    Heart disease Father     Colon cancer Maternal Uncle      Social History     Tobacco Use    Smoking status: Never     Passive exposure: Never    Smokeless tobacco: Never   Vaping Use    Vaping status: Never Used   Substance Use Topics    Alcohol use: Never    Drug use: Never     (Not in a hospital admission)    Allergies:  Statins, Acetaminophen, and Latex    Objective     Vital Signs  Temp:  [97.7 °F (36.5 °C)] 97.7 °F (36.5 °C)  Heart Rate:  [66] 66  Resp:  [16] 16  BP: (159)/(73) 159/73    Physical Exam  Vitals reviewed. Exam conducted with a chaperone present.   Constitutional:       Appearance: She is well-developed.   HENT:      Head: Normocephalic and atraumatic.   Eyes:      Pupils: Pupils are equal, round, and reactive to light.   Cardiovascular:      Rate and Rhythm: Normal rate and regular rhythm.   Pulmonary:      Effort: Pulmonary effort is normal.      Breath sounds: Normal breath sounds.   Chest:      Comments: Both breasts were examined the upright and supine position.  Both breasts exhibit normal shape and contour.  The right breast is status  postmastectomy with changes of a TRAM flap noted. She does have a quarter sized area of green resolving ecchymosis on at the 10 o'clock position of her TRAM flap.  There are no palpable subcutaneous masses noted.  The left breast has a well-healed curvilinear incision on the upper outer aspect of the nipple areolar complex and a well-healed transverse incision in the left axilla.  No overlying skin changes are noted, no palpable masses, no discharge from the nipple.  Abdominal:      General: There is no distension.      Palpations: Abdomen is soft.      Tenderness: There is no abdominal tenderness.      Hernia: No hernia is present.   Musculoskeletal:         General: Normal range of motion.      Cervical back: Normal range of motion.   Lymphadenopathy:      Cervical: No cervical adenopathy.      Upper Body:      Right upper body: No supraclavicular or axillary adenopathy.      Left upper body: No supraclavicular or axillary adenopathy.   Skin:     General: Skin is warm and dry.      Findings: No rash.   Neurological:      Mental Status: She is alert and oriented to person, place, and time.         Results Review:   Lab Results (last 24 hours)       ** No results found for the last 24 hours. **          No radiology results for the last day      I reviewed the patient's new imaging results and agree with the interpretation.  I reviewed the patient's other test results and agree with the interpretation    Assessment & Plan   Left breast cancer    Recommend continue to follow-up with medical oncology. They should continue to follow the patient with clinical breast exams and imaging.  Recommend continued clinical examinations by me every 6 months for 2 years following surgery.  May follow-up sooner if issues arise  Patient is aware that she has declined radiation therapy and endocrine therapy both of which may reduce the risk of recurrence in the future.    I discussed the patients findings and my recommendations with  the patient.     Jaime De Anda MD  04/29/24  08:14 EDT

## 2024-10-23 ENCOUNTER — TELEPHONE (OUTPATIENT)
Dept: ONCOLOGY | Facility: CLINIC | Age: 79
End: 2024-10-23

## 2024-10-23 NOTE — TELEPHONE ENCOUNTER
Caller: Shasha Freeman    Relationship to patient: Self    Best call back number: [phone]    Chief complaint: RESCHEDULE     Type of visit: LAB AND FOLLOW UP    Requested date: 11-5 HOUR BEFORE OR AFTER  10:00    If rescheduling, when is the original appointment: 10-30     Additional notes:PLEASE ADVISE

## 2024-10-23 NOTE — TELEPHONE ENCOUNTER
Caller: Shasha Freeman    Relationship to patient: Self    Best call back number: [phone]     Chief complaint: PATIENT TO RESCHEDULE 10/30/24 APPT    Type of visit: LAB AND FU 1    Requested date: PATIENT REQUESTING FIRST FULL WEEK OF NOVEMBER BUT WOULD PREFER 11/5/24 BEFORE OR AFTER APPT HUNTER/ MD MORAN

## 2024-11-05 ENCOUNTER — OFFICE VISIT (OUTPATIENT)
Dept: SURGERY | Facility: CLINIC | Age: 79
End: 2024-11-05
Payer: MEDICARE

## 2024-11-05 ENCOUNTER — LAB (OUTPATIENT)
Dept: LAB | Facility: HOSPITAL | Age: 79
End: 2024-11-05
Payer: MEDICARE

## 2024-11-05 ENCOUNTER — OFFICE VISIT (OUTPATIENT)
Dept: ONCOLOGY | Facility: CLINIC | Age: 79
End: 2024-11-05
Payer: MEDICARE

## 2024-11-05 VITALS
HEART RATE: 71 BPM | SYSTOLIC BLOOD PRESSURE: 152 MMHG | BODY MASS INDEX: 28.79 KG/M2 | WEIGHT: 190 LBS | TEMPERATURE: 98.2 F | OXYGEN SATURATION: 96 % | HEIGHT: 68 IN | DIASTOLIC BLOOD PRESSURE: 87 MMHG

## 2024-11-05 VITALS
DIASTOLIC BLOOD PRESSURE: 84 MMHG | HEIGHT: 68 IN | HEART RATE: 61 BPM | BODY MASS INDEX: 28.89 KG/M2 | SYSTOLIC BLOOD PRESSURE: 188 MMHG | WEIGHT: 190.6 LBS | RESPIRATION RATE: 16 BRPM | TEMPERATURE: 98.4 F | OXYGEN SATURATION: 95 %

## 2024-11-05 DIAGNOSIS — C50.212 MALIGNANT NEOPLASM OF UPPER-INNER QUADRANT OF LEFT BREAST IN FEMALE, ESTROGEN RECEPTOR POSITIVE: ICD-10-CM

## 2024-11-05 DIAGNOSIS — Z17.0 MALIGNANT NEOPLASM OF LOWER-INNER QUADRANT OF LEFT BREAST IN FEMALE, ESTROGEN RECEPTOR POSITIVE: Primary | ICD-10-CM

## 2024-11-05 DIAGNOSIS — C50.312 MALIGNANT NEOPLASM OF LOWER-INNER QUADRANT OF LEFT BREAST IN FEMALE, ESTROGEN RECEPTOR POSITIVE: Primary | ICD-10-CM

## 2024-11-05 DIAGNOSIS — Z17.0 MALIGNANT NEOPLASM OF UPPER-INNER QUADRANT OF LEFT BREAST IN FEMALE, ESTROGEN RECEPTOR POSITIVE: ICD-10-CM

## 2024-11-05 DIAGNOSIS — Z12.31 ENCOUNTER FOR SCREENING MAMMOGRAM FOR MALIGNANT NEOPLASM OF BREAST: Primary | ICD-10-CM

## 2024-11-05 DIAGNOSIS — Z85.3 PERSONAL HISTORY OF MALIGNANT NEOPLASM OF BREAST: ICD-10-CM

## 2024-11-05 LAB
ALBUMIN SERPL-MCNC: 4.3 G/DL (ref 3.5–5.2)
ALBUMIN/GLOB SERPL: 1.5 G/DL
ALP SERPL-CCNC: 137 U/L (ref 39–117)
ALT SERPL W P-5'-P-CCNC: 29 U/L (ref 1–33)
ANION GAP SERPL CALCULATED.3IONS-SCNC: 10 MMOL/L (ref 5–15)
AST SERPL-CCNC: 24 U/L (ref 1–32)
BASOPHILS # BLD AUTO: 0.03 10*3/MM3 (ref 0–0.2)
BASOPHILS NFR BLD AUTO: 0.4 % (ref 0–1.5)
BILIRUB SERPL-MCNC: 0.4 MG/DL (ref 0–1.2)
BUN SERPL-MCNC: 19 MG/DL (ref 8–23)
BUN/CREAT SERPL: 22.1 (ref 7–25)
CALCIUM SPEC-SCNC: 10 MG/DL (ref 8.6–10.5)
CHLORIDE SERPL-SCNC: 105 MMOL/L (ref 98–107)
CO2 SERPL-SCNC: 29 MMOL/L (ref 22–29)
CREAT SERPL-MCNC: 0.86 MG/DL (ref 0.57–1)
DEPRECATED RDW RBC AUTO: 45.3 FL (ref 37–54)
EGFRCR SERPLBLD CKD-EPI 2021: 69.7 ML/MIN/1.73
EOSINOPHIL # BLD AUTO: 0.13 10*3/MM3 (ref 0–0.4)
EOSINOPHIL NFR BLD AUTO: 1.9 % (ref 0.3–6.2)
ERYTHROCYTE [DISTWIDTH] IN BLOOD BY AUTOMATED COUNT: 13.4 % (ref 12.3–15.4)
GLOBULIN UR ELPH-MCNC: 2.9 GM/DL
GLUCOSE SERPL-MCNC: 100 MG/DL (ref 65–99)
HCT VFR BLD AUTO: 42.3 % (ref 34–46.6)
HGB BLD-MCNC: 13.7 G/DL (ref 12–15.9)
HOLD SPECIMEN: NORMAL
LYMPHOCYTES # BLD AUTO: 1.55 10*3/MM3 (ref 0.7–3.1)
LYMPHOCYTES NFR BLD AUTO: 23.1 % (ref 19.6–45.3)
MCH RBC QN AUTO: 30.6 PG (ref 26.6–33)
MCHC RBC AUTO-ENTMCNC: 32.4 G/DL (ref 31.5–35.7)
MCV RBC AUTO: 94.6 FL (ref 79–97)
MONOCYTES # BLD AUTO: 0.75 10*3/MM3 (ref 0.1–0.9)
MONOCYTES NFR BLD AUTO: 11.2 % (ref 5–12)
NEUTROPHILS NFR BLD AUTO: 4.26 10*3/MM3 (ref 1.7–7)
NEUTROPHILS NFR BLD AUTO: 63.4 % (ref 42.7–76)
PLATELET # BLD AUTO: 220 10*3/MM3 (ref 140–450)
PMV BLD AUTO: 9.3 FL (ref 6–12)
POTASSIUM SERPL-SCNC: 4.2 MMOL/L (ref 3.5–5.2)
PROT SERPL-MCNC: 7.2 G/DL (ref 6–8.5)
RBC # BLD AUTO: 4.47 10*6/MM3 (ref 3.77–5.28)
SODIUM SERPL-SCNC: 144 MMOL/L (ref 136–145)
WBC NRBC COR # BLD AUTO: 6.72 10*3/MM3 (ref 3.4–10.8)

## 2024-11-05 PROCEDURE — 36415 COLL VENOUS BLD VENIPUNCTURE: CPT

## 2024-11-05 PROCEDURE — 3077F SYST BP >= 140 MM HG: CPT | Performed by: SURGERY

## 2024-11-05 PROCEDURE — 99213 OFFICE O/P EST LOW 20 MIN: CPT | Performed by: SURGERY

## 2024-11-05 PROCEDURE — 1160F RVW MEDS BY RX/DR IN RCRD: CPT | Performed by: SURGERY

## 2024-11-05 PROCEDURE — 85025 COMPLETE CBC W/AUTO DIFF WBC: CPT

## 2024-11-05 PROCEDURE — 80053 COMPREHEN METABOLIC PANEL: CPT | Performed by: INTERNAL MEDICINE

## 2024-11-05 PROCEDURE — 1159F MED LIST DOCD IN RCRD: CPT | Performed by: SURGERY

## 2024-11-05 PROCEDURE — 3079F DIAST BP 80-89 MM HG: CPT | Performed by: SURGERY

## 2024-11-05 NOTE — PROGRESS NOTES
GENERAL SURGERY ESTABLISHED PATIENT NOTE    Patient Care Team:  Sherine Blackman MD as PCP - General (Family Medicine)  Jaime De Anda MD as Surgeon (General Surgery)  Kwasi Rich MD as Consulting Physician (Hematology and Oncology)    Reason for follow-up: 6-month follow-up from left lumpectomy and sentinel lymph node biopsy    Subjective     Patient is a 79 y.o. female presents for 6-month follow-up after undergoing a left lumpectomy and sentinel lymph node biopsy on 10/20/2022.  The patient's pathology demonstrated residual moderately differentiated ductal carcinoma measuring 1.3 cm completely excised and 1 sentinel lymph node which was negative for malignancy.  The patient was seen by radiation oncology postoperatively and declined radiation therapy.  She was also seen by medical oncology and declined endocrine therapy.  She recently had a screening mammogram with tomosynthesis on the left April 2024 which demonstrated benign findings, no mammographic evidence of malignancy and routine screening mammography was recommended going forward.  Her past medical history is significant for breast cancer on the right side which was treated with a mastectomy and a TRAM flap many years ago.    Review of Systems   Genitourinary:  Negative for breast discharge, breast lump and breast pain.   Hematological:  Negative for adenopathy.        History  Past Medical History:   Diagnosis Date    Abnormal ECG     Murmur    Breast cancer left. Underwent lumpectomy. No adjuvant therapy per her own choice 2023    Breast cancer right ER positive. Treated with mastectomy and no adjuvant therapy 1995    Cholelithiasis     Didn’t need surgery    Colon polyp Years ago    Had removed    Dermatitis     ears    Diverticulitis of colon     Herpes simplex     about every 5 years    Hypertension     Macular degeneration     right eye  minimal    Osteopenia     Peripheral edema     with travel at times    Pulmonary arterial  hypertension     Had for years     Past Surgical History:   Procedure Laterality Date    BREAST BIOPSY      BREAST LUMPECTOMY Left 10/20/2022    Procedure: BREAST LUMPECTOMY;  Surgeon: Jaime De Anda MD;  Location: [facility] MAIN OR;  Service: General;  Laterality: Left;    BREAST RECONSTRUCTION Right     CATARACT EXTRACTION Bilateral     with IOL    HYSTERECTOMY  01/12/2023    SENTINEL NODE BIOPSY Left 10/20/2022    Procedure: SENTINEL NODE BIOPSY;  Surgeon: Jaime De Anda MD;  Location: [facility] MAIN OR;  Service: General;  Laterality: Left;    SIMPLE MASTECTOMY Right     1995    TONSILLECTOMY      TOOTH EXTRACTION       Family History   Problem Relation Age of Onset    Lymphoma Mother         Non-hodgkins lymphoma    Heart disease Father     Colon cancer Maternal Uncle      Social History     Tobacco Use    Smoking status: Never     Passive exposure: Never    Smokeless tobacco: Never   Vaping Use    Vaping status: Never Used   Substance Use Topics    Alcohol use: Never    Drug use: Never     (Not in a hospital admission)    Allergies:  Statins, Acetaminophen, and Latex    Objective     Vital Signs  Temp:  [98.2 °F (36.8 °C)-98.4 °F (36.9 °C)] 98.4 °F (36.9 °C)  Heart Rate:  [61-71] 61  Resp:  [16] 16  BP: (152-188)/(84-87) 188/84    Physical Exam  Vitals reviewed. Exam conducted with a chaperone present.   Constitutional:       Appearance: She is well-developed.   HENT:      Head: Normocephalic and atraumatic.   Eyes:      Pupils: Pupils are equal, round, and reactive to light.   Cardiovascular:      Rate and Rhythm: Normal rate and regular rhythm.   Pulmonary:      Effort: Pulmonary effort is normal.      Breath sounds: Normal breath sounds.   Chest:      Comments: Both breasts were examined the upright and supine position.  Both breasts exhibit normal shape and contour.  The right breast is status postmastectomy with changes of a TRAM flap noted. She does have a quarter sized area of green  resolving ecchymosis on at the 10 o'clock position of her TRAM flap.  There are no palpable subcutaneous masses noted.  The left breast has a well-healed curvilinear incision on the upper outer aspect of the nipple areolar complex and a well-healed transverse incision in the left axilla.  No overlying skin changes are noted, no palpable masses, no discharge from the nipple.  Abdominal:      General: There is no distension.      Palpations: Abdomen is soft.      Tenderness: There is no abdominal tenderness.      Hernia: No hernia is present.   Musculoskeletal:         General: Normal range of motion.      Cervical back: Normal range of motion.   Lymphadenopathy:      Cervical: No cervical adenopathy.      Upper Body:      Right upper body: No supraclavicular or axillary adenopathy.      Left upper body: No supraclavicular or axillary adenopathy.   Skin:     General: Skin is warm and dry.      Findings: No rash.   Neurological:      Mental Status: She is alert and oriented to person, place, and time.         Results Review:   Lab Results (last 24 hours)       ** No results found for the last 24 hours. **          No radiology results for the last day      I reviewed the patient's new imaging results and agree with the interpretation.  I reviewed the patient's other test results and agree with the interpretation    Assessment & Plan   Left breast cancer    The patient has followed up with me for a total of 2 years without evidence of recurrence.  At this time she does not need to continue to follow-up with me every 6 months  We did discuss that deferring radiation and endocrine therapy puts her at increased risk of recurrence.  The patient believes that living a healthy lifestyle will protect her more than NCCN recommended guidelines for treatment of breast cancer  Encouraged to follow-up with us again should she develop any evidence of recurrence    I discussed the patients findings and my recommendations with the  patient.     Jaime De Anda MD  11/05/24  10:26 EST

## 2024-11-05 NOTE — PROGRESS NOTES
HEMATOLOGY ONCOLOGY OUTPATIENT FOLLOW-UP      Patient name: Shasha Freeman  : 1945  MRN: [id number]  Primary Care Physician: Sherine Blackman MD  Referring Physician: Sherine Blackman,*  Reason For Consult: Left estrogen receptor positive breast cancer.    History of Present Illness:  Patient is a 79 y.o.     10/30/2023: For the first time in the office to follow her for breast cancer.  In 2022 she underwent a screening mammogram. It reported a solid nodule that measured 9 x 8 x 6 mm in the posterior region of the left breast at the 11 o'clock position, approximately 8 cm from the nipple.  This was further confirmed with additional images and underwent a core biopsy which reported a well differentiated ductal carcinoma that was strongly ER positive at 100% of the cells and AL positive at 90% of the cells.  Her menarche was at the age of 15 and her first pregnancy at the age of 25.  She was pregnant 4 times and had 2 live births.  She never breast-fed.  She never received estrogen replacement therapy after menopause which happened at the age of 52 or 53.  She took oral contraceptives for a brief period of time. On 10/20/2022 she underwent an uncomplicated left breast lumpectomy. The final report of pathology was of moderately differentiated invasive carcinoma that measured 13 mm.  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  It had a cribriform pattern and grade 2 histologic grade.  All margins were clear.  The sentinel lymph node was negative. She was offered adjuvant radiation and hormonal manipulation with an aromatase inhibitor. She declined. Since the surgery she was followed by her surgeon and had not been found to have any suggestion of recurrent disease. In 2023 she underwent total abdominal hysterectomy Pathology reported adenomyosis and an endometrial polyp, but otherwise there was no significant abnormality. At the time of this visit she  was feeling well and had new symptoms. She had been as active as before. Her family history and social history were reviewed. On exam alert, conversant and not ill or in distress. No jaundice. No oral lesions and no palpable adenopathy. The lungs clear and heart regular. No edema. Discussed with her again the use of adjuvant hormonal manipulation. She  declined again. Given that she has had two bilateral asynchronous breast cancers, genetic counseling fidel be appropriate. She declined again. I asked to return to see me in 6 months with her next mammogram.     4/29/2024: Without new complaints.  As active as before.  He eating as well.  No fevers or unintended weight loss.  No new pain and no dyspnea, diarrhea or dysuria.  On exam alert, conversant and well-oriented.  The lungs are clear bilaterally and the heart is regular.  No palpable cervical, supraclavicular or axillary adenopathy.  Abdomen soft and nontender.  Liver and spleen not enlarged.  No edema.  Reviewed the mammogram and discussed with her.  Reviewed the laboratory exams and discussed with her.  Awaiting the chemistry.  No change at this time.  She is to see me in 6 months.    11/5/2024: Patient is here today for routine follow-up.  She reports overall doing well.  She has no new complaints states she remains active.  She has no new pain.  She has no new breast complaints.  On exam she is alert well-oriented and conversant.  Lungs are clear bilaterally and the heart is regular.  No palpable lymphadenopathy noted supraclavicular or axillary.  Abdomen is soft and nontender.  Postsurgical changes noted otherwise breast exam unremarkable.  Reviewed CBC and discussed with her.  Chemistry panel is pending.    Past Medical History:   Diagnosis Date    Abnormal ECG     Murmur    Breast cancer left. Underwent lumpectomy. No adjuvant therapy per her own choice 2023    Breast cancer right ER positive. Treated with mastectomy and no adjuvant therapy 1995     Cholelithiasis     Didn’t need surgery    Colon polyp Years ago    Had removed    Dermatitis     ears    Diverticulitis of colon     Herpes simplex     about every 5 years    Hypertension     Macular degeneration     right eye  minimal    Osteopenia     Peripheral edema     with travel at times    Pulmonary arterial hypertension     Had for years     Past Surgical History:   Procedure Laterality Date    BREAST BIOPSY      BREAST LUMPECTOMY Left 10/20/2022    Procedure: BREAST LUMPECTOMY;  Surgeon: Jaime De Anda MD;  Location: [facility] MAIN OR;  Service: General;  Laterality: Left;    BREAST RECONSTRUCTION Right     CATARACT EXTRACTION Bilateral     with IOL    HYSTERECTOMY  01/12/2023    SENTINEL NODE BIOPSY Left 10/20/2022    Procedure: SENTINEL NODE BIOPSY;  Surgeon: Jaime De Anda MD;  Location: [facility] MAIN OR;  Service: General;  Laterality: Left;    SIMPLE MASTECTOMY Right     1995    TONSILLECTOMY      TOOTH EXTRACTION         Current Outpatient Medications:     amLODIPine (NORVASC) 5 MG tablet, Take 1 tablet by mouth Daily. Take preop, Disp: , Rfl:     aspirin 81 MG chewable tablet, Chew 1 tablet Daily. Ld  10/15, Disp: , Rfl:     Bystolic 10 MG tablet, Take 1 tablet by mouth Every Evening., Disp: , Rfl:     cholecalciferol (VITAMIN D3) 25 MCG (1000 UT) tablet, Take 1 tablet by mouth Every Evening. Ld 10/13, Disp: , Rfl:     mometasone (ELOCON) 0.1 % cream, Apply 1 Application topically to the appropriate area as directed As Needed. None after bathing started, Disp: , Rfl:     Multiple Vitamins-Minerals (ICAPS AREDS 2 PO), Take 1 tablet by mouth 2 (Two) Times a Day. Ld 10/13, Disp: , Rfl:     multivitamin (THERAGRAN) tablet tablet, Take 1 tablet by mouth Daily. Ld 10/13, Disp: , Rfl:     spironolactone (ALDACTONE) 25 MG tablet, Take 1 tablet by mouth Daily As Needed. None am surgery (Patient not taking: Reported on 11/5/2024), Disp: , Rfl:     Allergies   Allergen Reactions    Statins  Myalgia    Acetaminophen Palpitations    Latex Rash     Family History   Problem Relation Age of Onset    Lymphoma Mother         Non-hodgkins lymphoma    Heart disease Father     Colon cancer Maternal Uncle        Cancer-related family history includes Colon cancer in her maternal uncle; Lymphoma in her mother.    Social History     Tobacco Use    Smoking status: Never     Passive exposure: Never    Smokeless tobacco: Never   Vaping Use    Vaping status: Never Used   Substance Use Topics    Alcohol use: Never    Drug use: Never     Social History     Social History Narrative    Not on file     ROS:   Review of Systems   Constitutional:  Negative for activity change, appetite change, chills, fatigue, fever and unexpected weight change.   HENT:  Negative for ear pain, mouth sores, nosebleeds, sore throat and trouble swallowing.    Eyes:  Negative for photophobia and visual disturbance.   Respiratory:  Negative for cough, shortness of breath, wheezing and stridor.    Cardiovascular:  Negative for chest pain, palpitations and leg swelling.   Gastrointestinal:  Negative for abdominal pain, diarrhea, nausea and vomiting.   Endocrine: Negative for cold intolerance and heat intolerance.   Genitourinary:  Negative for difficulty urinating, dysuria, hematuria and menstrual problem.   Musculoskeletal:  Negative for arthralgias, back pain, joint swelling and neck stiffness.   Skin:  Negative for color change, pallor, rash and wound.   Neurological:  Negative for dizziness, seizures, syncope, weakness, light-headedness, numbness and headaches.   Hematological:  Negative for adenopathy. Does not bruise/bleed easily.        No obvious bleeding   Psychiatric/Behavioral:  Negative for agitation, confusion, hallucinations and sleep disturbance. The patient is not nervous/anxious.      Objective:    Vital Signs:  Vitals:    11/05/24 0848   BP: 152/87   Pulse: 71   Temp: 98.2 °F (36.8 °C)   SpO2: 96%   Weight: 86.2 kg (190 lb)  "  Height: 172.7 cm (67.99\")   PainSc: 0-No pain       Body mass index is 28.9 kg/m².    ECOG  (0) Fully active, able to carry on all predisease performance without restriction      Physical Exam:   Physical Exam  Vitals reviewed.   Constitutional:       General: She is not in acute distress.     Appearance: Normal appearance. She is not toxic-appearing.   HENT:      Head: Normocephalic and atraumatic.      Right Ear: External ear normal.      Left Ear: External ear normal.      Nose: Nose normal.   Eyes:      General: No scleral icterus.     Pupils: Pupils are equal, round, and reactive to light.   Cardiovascular:      Rate and Rhythm: Normal rate and regular rhythm.      Pulses: Normal pulses.   Pulmonary:      Effort: Pulmonary effort is normal.      Breath sounds: Normal breath sounds. No wheezing.   Abdominal:      General: There is no distension.      Palpations: Abdomen is soft. There is no mass.      Tenderness: There is no abdominal tenderness.   Musculoskeletal:         General: No swelling or tenderness. Normal range of motion.      Cervical back: Normal range of motion and neck supple.   Lymphadenopathy:      Upper Body:      Right upper body: No supraclavicular, axillary or pectoral adenopathy.      Left upper body: No supraclavicular, axillary or pectoral adenopathy.   Skin:     General: Skin is warm.      Coloration: Skin is not jaundiced or pale.      Findings: No bruising, erythema, lesion or rash.   Neurological:      General: No focal deficit present.      Mental Status: She is alert and oriented to person, place, and time. Mental status is at baseline.      Cranial Nerves: No cranial nerve deficit.      Motor: No weakness.   Psychiatric:         Mood and Affect: Mood normal.         Behavior: Behavior normal.         Thought Content: Thought content normal.         Judgment: Judgment normal.       Lab Results - Last 18 Months   Lab Units 11/05/24  0839 04/29/24  0901 10/30/23  0918   WBC 10*3/mm3 " 6.72 6.44 6.13   HEMOGLOBIN g/dL 13.7 13.6 14.1   HEMATOCRIT % 42.3 42.7 42.9   PLATELETS 10*3/mm3 220 224 224   MCV fL 94.6 93.2 92.1     Lab Results - Last 18 Months   Lab Units 04/29/24  0901   SODIUM mmol/L 142   POTASSIUM mmol/L 3.7   CHLORIDE mmol/L 104   CO2 mmol/L 30.0*   BUN mg/dL 14   CREATININE mg/dL 0.82   CALCIUM mg/dL 9.3   BILIRUBIN mg/dL 0.4   ALK PHOS U/L 151*   ALT (SGPT) U/L 22   AST (SGOT) U/L 21   GLUCOSE mg/dL 103*     Lab Results   Component Value Date    GLUCOSE 103 (H) 04/29/2024    BUN 14 04/29/2024    CREATININE 0.82 04/29/2024    BCR 17.1 04/29/2024    K 3.7 04/29/2024    CO2 30.0 (H) 04/29/2024    CALCIUM 9.3 04/29/2024    ALBUMIN 4.0 04/29/2024    AST 21 04/29/2024    ALT 22 04/29/2024     Assessment & Plan     [id number] moderately differentiated ER, LA positive, Her2 negative left breast cancer. Underwent lumpectomy. Declines any additional treatment. Declines genetic counseling.  No suggestion of recurrent disease at this time.  History of right breast cancer.  Underwent mastectomy  Hypertension.  Osteopenia.  On calcium vitamin D  Reviewed CBC  Follow-up with Dr. Rich in 6 months, sooner if condition indicates.  She is agreeable with the above plan    Electronically signed by Rosa Torre PA-C      Time spent on encounter including record review, history taking, exam, discussion, counseling and documentation at: 30 minutes

## 2024-11-12 ENCOUNTER — TELEPHONE (OUTPATIENT)
Dept: ONCOLOGY | Facility: CLINIC | Age: 79
End: 2024-11-12
Payer: MEDICARE

## 2024-11-12 NOTE — TELEPHONE ENCOUNTER
Caller: Shasha Freeman    Relationship: Self    Best call back number: [phone]    What form or medical record are you requesting: PAPER COPY OF LABS THAT WERE DRAWN ON 11/5/24    Who is requesting this form or medical record from you: PT    How would you like to receive the form or medical records (pick-up, mail, fax): MAIL    what is the address: [address] [place] [address] [place] [address]     Timeframe paperwork needed: ASAP

## 2025-04-14 ENCOUNTER — TELEPHONE (OUTPATIENT)
Dept: ONCOLOGY | Facility: CLINIC | Age: 80
End: 2025-04-14
Payer: MEDICARE

## 2025-04-14 NOTE — TELEPHONE ENCOUNTER
Called Pt to reschedule appt that has a 6 mon w/ Dr Rich, out a few weeks from original appt on 5/5

## 2025-04-15 NOTE — TELEPHONE ENCOUNTER
Caller: Shasha Freeman    Relationship: Self    Best call back number: [phone]      Who are you requesting to speak with (clinical staff, provider,  specific staff member): SANJEEV    Do you know the name of the person who called: SANJEEV    What was the call regarding: PATIENT RETURNING CALL ATTEMPTED WT

## 2025-04-23 ENCOUNTER — HOSPITAL ENCOUNTER (OUTPATIENT)
Dept: MAMMOGRAPHY | Facility: HOSPITAL | Age: 80
Discharge: HOME | End: 2025-04-23
Admitting: PHYSICIAN ASSISTANT
Payer: MEDICARE

## 2025-04-23 DIAGNOSIS — Z85.3 PERSONAL HISTORY OF MALIGNANT NEOPLASM OF BREAST: ICD-10-CM

## 2025-04-23 DIAGNOSIS — Z17.0 MALIGNANT NEOPLASM OF UPPER-INNER QUADRANT OF LEFT BREAST IN FEMALE, ESTROGEN RECEPTOR POSITIVE: ICD-10-CM

## 2025-04-23 DIAGNOSIS — C50.212 MALIGNANT NEOPLASM OF UPPER-INNER QUADRANT OF LEFT BREAST IN FEMALE, ESTROGEN RECEPTOR POSITIVE: ICD-10-CM

## 2025-04-23 DIAGNOSIS — Z12.31 ENCOUNTER FOR SCREENING MAMMOGRAM FOR MALIGNANT NEOPLASM OF BREAST: ICD-10-CM

## 2025-04-23 PROCEDURE — 77067 SCR MAMMO BI INCL CAD: CPT

## 2025-04-23 PROCEDURE — 77063 BREAST TOMOSYNTHESIS BI: CPT

## 2025-04-24 ENCOUNTER — TELEPHONE (OUTPATIENT)
Dept: ONCOLOGY | Facility: CLINIC | Age: 80
End: 2025-04-24
Payer: MEDICARE

## 2025-04-24 NOTE — TELEPHONE ENCOUNTER
Caller: Shasha Freeman    Relationship: Self    Best call back number: [phone]    Caller requesting test results: YES    What test was performed: MAMMOGRAM     When was the test performed: 4-23    Where was the test performed:      Additional notes: PLEASE CALL WITH RESULTS

## 2025-05-05 ENCOUNTER — HOSPITAL ENCOUNTER (OUTPATIENT)
Dept: MAMMOGRAPHY | Facility: HOSPITAL | Age: 80
Discharge: HOME | End: 2025-05-05
Payer: MEDICARE

## 2025-05-05 ENCOUNTER — HOSPITAL ENCOUNTER (OUTPATIENT)
Dept: ULTRASOUND IMAGING | Facility: HOSPITAL | Age: 80
Discharge: HOME | End: 2025-05-05
Payer: MEDICARE

## 2025-05-05 DIAGNOSIS — N64.89 BREAST ASYMMETRY: ICD-10-CM

## 2025-05-05 DIAGNOSIS — R92.8 ABNORMAL SCREENING MAMMOGRAM: ICD-10-CM

## 2025-05-05 DIAGNOSIS — Z85.3 PERSONAL HISTORY OF MALIGNANT NEOPLASM OF BREAST: ICD-10-CM

## 2025-05-05 PROCEDURE — G0279 TOMOSYNTHESIS, MAMMO: HCPCS

## 2025-05-05 PROCEDURE — 76882 US LMTD JT/FCL EVL NVASC XTR: CPT

## 2025-05-05 PROCEDURE — 77065 DX MAMMO INCL CAD UNI: CPT

## 2025-05-05 PROCEDURE — 76642 ULTRASOUND BREAST LIMITED: CPT

## 2025-05-21 ENCOUNTER — HOSPITAL ENCOUNTER (OUTPATIENT)
Dept: MAMMOGRAPHY | Facility: HOSPITAL | Age: 80
Discharge: HOME | End: 2025-05-21
Payer: MEDICARE

## 2025-05-21 ENCOUNTER — HOSPITAL ENCOUNTER (OUTPATIENT)
Dept: ULTRASOUND IMAGING | Facility: HOSPITAL | Age: 80
Discharge: HOME | End: 2025-05-21
Payer: MEDICARE

## 2025-05-21 DIAGNOSIS — R92.8 ABNORMAL MAMMOGRAM OF LEFT BREAST: ICD-10-CM

## 2025-05-21 PROCEDURE — 25010000002 LIDOCAINE-EPINEPHRINE 2 %-1:100000 SOLUTION: Performed by: PHYSICIAN ASSISTANT

## 2025-05-21 PROCEDURE — 25010000002 LIDOCAINE 1 % SOLUTION: Performed by: PHYSICIAN ASSISTANT

## 2025-05-21 PROCEDURE — 88305 TISSUE EXAM BY PATHOLOGIST: CPT | Performed by: PHYSICIAN ASSISTANT

## 2025-05-21 PROCEDURE — A4648 IMPLANTABLE TISSUE MARKER: HCPCS

## 2025-05-21 PROCEDURE — 88360 TUMOR IMMUNOHISTOCHEM/MANUAL: CPT | Performed by: PHYSICIAN ASSISTANT

## 2025-05-21 RX ORDER — LIDOCAINE HYDROCHLORIDE 10 MG/ML
10 INJECTION, SOLUTION INFILTRATION; PERINEURAL ONCE
Status: COMPLETED | OUTPATIENT
Start: 2025-05-21 | End: 2025-05-21

## 2025-05-21 RX ORDER — LIDOCAINE HYDROCHLORIDE AND EPINEPHRINE BITARTRATE 20; .01 MG/ML; MG/ML
10 INJECTION, SOLUTION SUBCUTANEOUS ONCE
Status: COMPLETED | OUTPATIENT
Start: 2025-05-21 | End: 2025-05-21

## 2025-05-21 RX ADMIN — LIDOCAINE HYDROCHLORIDE 5 ML: 10 INJECTION, SOLUTION INFILTRATION; PERINEURAL at 14:22

## 2025-05-21 RX ADMIN — LIDOCAINE HYDROCHLORIDE AND EPINEPHRINE 8 ML: 20; 10 INJECTION, SOLUTION INFILTRATION; PERINEURAL at 14:23

## 2025-05-22 ENCOUNTER — TELEPHONE (OUTPATIENT)
Dept: MAMMOGRAPHY | Facility: HOSPITAL | Age: 80
End: 2025-05-22
Payer: MEDICARE

## 2025-05-23 LAB
LAB AP CASE REPORT: NORMAL
LAB AP DIAGNOSIS COMMENT: NORMAL
LAB AP SPECIAL STAINS: NORMAL
PATH REPORT.FINAL DX SPEC: NORMAL
PATH REPORT.GROSS SPEC: NORMAL

## 2025-05-27 NOTE — PROGRESS NOTES
HEMATOLOGY ONCOLOGY OUTPATIENT FOLLOW-UP      Patient name: Shasha Freeman  : 1945  MRN: [id number]  Primary Care Physician: Sherine Blackman MD  Referring Physician: No ref. provider found  Reason For Consult: Left estrogen receptor positive breast cancer.    History of Present Illness:  Patient is a 80 y.o.     10/30/2023: For the first time in the office to follow her for breast cancer.  In 2022 she underwent a screening mammogram. It reported a solid nodule that measured 9 x 8 x 6 mm in the posterior region of the left breast at the 11 o'clock position, approximately 8 cm from the nipple.  This was further confirmed with additional images and underwent a core biopsy which reported a well differentiated ductal carcinoma that was strongly ER positive at 100% of the cells and UT positive at 90% of the cells.  Her menarche was at the age of 15 and her first pregnancy at the age of 25.  She was pregnant 4 times and had 2 live births.  She never breast-fed.  She never received estrogen replacement therapy after menopause which happened at the age of 52 or 53.  She took oral contraceptives for a brief period of time. On 10/20/2022 she underwent an uncomplicated left breast lumpectomy. The final report of pathology was of moderately differentiated invasive carcinoma that measured 13 mm.  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  It had a cribriform pattern and grade 2 histologic grade.  All margins were clear.  The sentinel lymph node was negative. She was offered adjuvant radiation and hormonal manipulation with an aromatase inhibitor. She declined. Since the surgery she was followed by her surgeon and had not been found to have any suggestion of recurrent disease. In 2023 she underwent total abdominal hysterectomy Pathology reported adenomyosis and an endometrial polyp, but otherwise there was no significant abnormality. At the time of this visit she was  feeling well and had new symptoms. She had been as active as before. Her family history and social history were reviewed. On exam alert, conversant and not ill or in distress. No jaundice. No oral lesions and no palpable adenopathy. The lungs clear and heart regular. No edema. Discussed with her again the use of adjuvant hormonal manipulation. She  declined again. Given that she has had two bilateral asynchronous breast cancers, genetic counseling fidel be appropriate. She declined again. I asked to return to see me in 6 months with her next mammogram.     4/29/2024: Without new complaints.  As active as before.  He eating as well.  No fevers or unintended weight loss.  No new pain and no dyspnea, diarrhea or dysuria.  On exam alert, conversant and well-oriented.  The lungs are clear bilaterally and the heart is regular.  No palpable cervical, supraclavicular or axillary adenopathy.  Abdomen soft and nontender.  Liver and spleen not enlarged.  No edema.  Reviewed the mammogram and discussed with her.  Reviewed the laboratory exams and discussed with her.  Awaiting the chemistry.  No change at this time.  She is to see me in 6 months.    11/5/2024: Patient is here today for routine follow-up.  She reports overall doing well.  She has no new complaints states she remains active.  She has no new pain.  She has no new breast complaints.  On exam she is alert well-oriented and conversant.  Lungs are clear bilaterally and the heart is regular.  No palpable lymphadenopathy noted supraclavicular or axillary.  Abdomen is soft and nontender.  Postsurgical changes noted otherwise breast exam unremarkable.  Reviewed CBC and discussed with her.  Chemistry panel is pending.     5/30/2025: She underwent the scheduled screening mammogram.  It reported a left breast focal asymmetry with possible distortion.  A diagnostic mammogram was recommended.  This confirmed a 4 mm hypoechoic taller than wide shadowing tumor.  It was irregularly  shaped.  This was thought send followed by an image guided biopsy is of invasive well-differentiated ductal carcinoma that was strongly estrogen receptor positive and 100% of the cells and progesterone receptor positive and 75% of the cells.  HER2 was equivocal at 2+.  She is feeling well and has had no symptoms.  She is eating well and has on purpose lost weight.  She has no fevers.  No chest pains or cough and no abdominal pain or diarrhea.  No dysuria.  On exam alert and conversant.  Oriented.  No distress.  No jaundice.  The lungs are clear bilaterally and the heart regular.  Abdomen protuberant but soft.  No edema.  Laboratory exams reviewed and discussed with her.  Discussed with her at length the findings of the mammogram as well as of the final report of pathology.  Discussed the importance of surgery as well as the conservation of adjuvant treatment following the surgery.  I have referred her to Dr. De Anda and asked her to see me in a few weeks, after the surgery.    Past Medical History:   Diagnosis Date    Abnormal ECG     Murmur    Breast cancer left. Underwent lumpectomy. No adjuvant therapy per her own choice 2023    Breast cancer right ER positive. Treated with mastectomy and no adjuvant therapy 1995    Cholelithiasis     Didn’t need surgery    Colon polyp Years ago    Had removed    Dermatitis     ears    Diverticulitis of colon     Herpes simplex     about every 5 years    Hypertension     Macular degeneration     right eye  minimal    Osteopenia     Peripheral edema     with travel at times    Pulmonary arterial hypertension     Had for years     Past Surgical History:   Procedure Laterality Date    BREAST BIOPSY      BREAST LUMPECTOMY Left 10/20/2022    Procedure: BREAST LUMPECTOMY;  Surgeon: Jaime De Anda MD;  Location: [facility] MAIN OR;  Service: General;  Laterality: Left;    BREAST RECONSTRUCTION Right     CATARACT EXTRACTION Bilateral     with IOL    HYSTERECTOMY  01/12/2023     SENTINEL NODE BIOPSY Left 10/20/2022    Procedure: SENTINEL NODE BIOPSY;  Surgeon: Jaime De Anda MD;  Location: [facility] MAIN OR;  Service: General;  Laterality: Left;    SIMPLE MASTECTOMY Right     1995    TONSILLECTOMY      TOOTH EXTRACTION         Current Outpatient Medications:     aspirin 81 MG chewable tablet, Chew 1 tablet Daily. Ld  10/15, Disp: , Rfl:     Bystolic 10 MG tablet, Take 1 tablet by mouth Every Evening., Disp: , Rfl:     cholecalciferol (VITAMIN D3) 25 MCG (1000 UT) tablet, Take 1 tablet by mouth Every Evening. Ld 10/13, Disp: , Rfl:     DHA-EPA-Flaxseed Oil-Vitamin E (THERA TEARS NUTRITION PO), Thera Tears Nutrition, Disp: , Rfl:     Multiple Vitamins-Minerals (ICAPS AREDS 2 PO), Take 1 tablet by mouth 2 (Two) Times a Day. Ld 10/13, Disp: , Rfl:     multivitamin (THERAGRAN) tablet tablet, Take 1 tablet by mouth Daily. Ld 10/13, Disp: , Rfl:     spironolactone (ALDACTONE) 25 MG tablet, Take 1 tablet by mouth Daily As Needed. None am surgery, Disp: , Rfl:     Allergies   Allergen Reactions    Statins Myalgia    Acetaminophen Palpitations    Latex Rash     Family History   Problem Relation Age of Onset    Lymphoma Mother         Non-hodgkins lymphoma    Heart disease Father     Colon cancer Maternal Uncle        Cancer-related family history includes Colon cancer in her maternal uncle; Lymphoma in her mother.    Social History     Tobacco Use    Smoking status: Never     Passive exposure: Never    Smokeless tobacco: Never   Vaping Use    Vaping status: Never Used   Substance Use Topics    Alcohol use: Never    Drug use: Never     Social History     Social History Narrative    Not on file     ROS:   Review of Systems   Constitutional:  Negative for activity change, appetite change, chills, diaphoresis, fatigue, fever and unexpected weight change.   HENT:  Negative for congestion, dental problem, drooling, ear discharge, ear pain, facial swelling, hearing loss, mouth sores, nosebleeds,  "postnasal drip, rhinorrhea, sinus pressure, sinus pain, sneezing, sore throat, tinnitus, trouble swallowing and voice change.    Eyes:  Negative for photophobia, pain, discharge, redness, itching and visual disturbance.   Respiratory:  Negative for apnea, cough, choking, chest tightness, shortness of breath, wheezing and stridor.    Cardiovascular:  Negative for chest pain, palpitations and leg swelling.   Gastrointestinal:  Negative for abdominal distention, abdominal pain, anal bleeding, blood in stool, constipation, diarrhea, nausea, rectal pain and vomiting.   Endocrine: Negative for cold intolerance, heat intolerance, polydipsia and polyuria.   Genitourinary:  Negative for decreased urine volume, difficulty urinating, dysuria, flank pain, frequency, genital sores, hematuria and urgency.   Musculoskeletal:  Negative for arthralgias, back pain, gait problem, joint swelling, myalgias, neck pain and neck stiffness.   Skin:  Negative for color change, pallor and rash.   Neurological:  Negative for dizziness, tremors, seizures, syncope, facial asymmetry, speech difficulty, weakness, light-headedness, numbness and headaches.   Hematological:  Negative for adenopathy. Does not bruise/bleed easily.   Psychiatric/Behavioral:  Negative for agitation, behavioral problems, confusion, decreased concentration, hallucinations, self-injury, sleep disturbance and suicidal ideas. The patient is not nervous/anxious.      Objective:    Vital Signs:  Vitals:    05/30/25 1230   BP: (!) 196/99   Pulse: 64   SpO2: 94%   Weight: 85.7 kg (189 lb)   Height: 172.7 cm (67.99\")   PainSc: 0-No pain     Body mass index is 28.75 kg/m².    ECOG  (0) Fully active, able to carry on all predisease performance without restriction    Physical Exam:   Physical Exam  Constitutional:       General: She is not in acute distress.     Appearance: Normal appearance. She is not ill-appearing, toxic-appearing or diaphoretic.   HENT:      Head: Normocephalic " and atraumatic.      Right Ear: External ear normal.      Left Ear: External ear normal.      Nose: Nose normal.      Mouth/Throat:      Mouth: Mucous membranes are moist.      Pharynx: Oropharynx is clear. No oropharyngeal exudate or posterior oropharyngeal erythema.   Eyes:      General: No scleral icterus.        Right eye: No discharge.         Left eye: No discharge.      Conjunctiva/sclera: Conjunctivae normal.      Pupils: Pupils are equal, round, and reactive to light.   Cardiovascular:      Rate and Rhythm: Normal rate and regular rhythm.      Pulses: Normal pulses.      Heart sounds: No murmur heard.     No friction rub. No gallop.   Pulmonary:      Effort: No respiratory distress.      Breath sounds: No stridor. No wheezing, rhonchi or rales.   Abdominal:      General: Abdomen is flat. Bowel sounds are normal. There is no distension.      Palpations: Abdomen is soft. There is no mass.      Tenderness: There is no abdominal tenderness. There is no right CVA tenderness, left CVA tenderness, guarding or rebound.      Hernia: No hernia is present.   Musculoskeletal:         General: No swelling, tenderness, deformity or signs of injury.      Cervical back: No rigidity.      Right lower leg: No edema.      Left lower leg: No edema.   Lymphadenopathy:      Cervical: No cervical adenopathy.   Skin:     Coloration: Skin is not jaundiced.      Findings: No bruising, lesion or rash.   Neurological:      General: No focal deficit present.      Mental Status: She is alert and oriented to person, place, and time.      Cranial Nerves: No cranial nerve deficit.      Motor: No weakness.      Gait: Gait normal.   Psychiatric:         Mood and Affect: Mood normal.         Behavior: Behavior normal.         Thought Content: Thought content normal.         Judgment: Judgment normal.     ANGELA Rich MD performed the physical exam on 5/30/2025 as documented above.    Lab Results - Last 18 Months   Lab Units  05/30/25  1235 11/05/24  0839 04/29/24  0901   WBC 10*3/mm3 7.70 6.72 6.44   HEMOGLOBIN g/dL 13.8 13.7 13.6   HEMATOCRIT % 42.6 42.3 42.7   PLATELETS 10*3/mm3 234 220 224   MCV fL 94.0 94.6 93.2     Lab Results - Last 18 Months   Lab Units 11/05/24  0839 04/29/24  0901   SODIUM mmol/L 144 142   POTASSIUM mmol/L 4.2 3.7   CHLORIDE mmol/L 105 104   CO2 mmol/L 29.0 30.0*   BUN mg/dL 19 14   CREATININE mg/dL 0.86 0.82   CALCIUM mg/dL 10.0 9.3   BILIRUBIN mg/dL 0.4 0.4   ALK PHOS U/L 137* 151*   ALT (SGPT) U/L 29 22   AST (SGOT) U/L 24 21   GLUCOSE mg/dL 100* 103*     Lab Results   Component Value Date    GLUCOSE 100 (H) 11/05/2024    BUN 19 11/05/2024    CREATININE 0.86 11/05/2024    BCR 22.1 11/05/2024    K 4.2 11/05/2024    CO2 29.0 11/05/2024    CALCIUM 10.0 11/05/2024    ALBUMIN 4.3 11/05/2024    AST 24 11/05/2024    ALT 29 11/05/2024     Assessment & Plan     [id number] moderately differentiated ER, ID positive, Her2 negative left breast cancer. Underwent lumpectomy. Declines any additional treatment. Declines genetic counseling.  No suggestion of recurrent disease at this time.  She is to see me in 6 months.  [id number] invasive well-differentiated ductal carcinoma estrogen receptor and progesterone receptor positive, HER2 equivocal diagnosed in May 2025.  History of right breast cancer.   Hypertension.  Reviewed and discussed with her the results of the mammogram and ultrasound.  Reviewed and discussed with her the results of pathology.  Discussed the treatment.  Referred her to Dr. De Anda.  See me in approximately 5 weeks, after surgery.    Kwasi Rich MD on 5/30/2025 at 1348.

## 2025-05-29 LAB
GLOBAL PROGNOSTIC (BREAST) RESULT: NORMAL
LAB AP CASE REPORT: NORMAL
LAB AP DIAGNOSIS COMMENT: NORMAL
LAB AP IHC HER2/NEU REPORT,ADDENDUM: NORMAL
LAB AP SPECIAL STAINS: NORMAL
PATH REPORT.FINAL DX SPEC: NORMAL
PATH REPORT.GROSS SPEC: NORMAL

## 2025-05-30 ENCOUNTER — OFFICE VISIT (OUTPATIENT)
Dept: ONCOLOGY | Facility: CLINIC | Age: 80
End: 2025-05-30
Payer: MEDICARE

## 2025-05-30 ENCOUNTER — LAB (OUTPATIENT)
Dept: LAB | Facility: HOSPITAL | Age: 80
End: 2025-05-30
Payer: MEDICARE

## 2025-05-30 VITALS
SYSTOLIC BLOOD PRESSURE: 196 MMHG | HEIGHT: 68 IN | BODY MASS INDEX: 28.64 KG/M2 | HEART RATE: 64 BPM | WEIGHT: 189 LBS | OXYGEN SATURATION: 94 % | DIASTOLIC BLOOD PRESSURE: 99 MMHG

## 2025-05-30 DIAGNOSIS — Z85.3 PERSONAL HISTORY OF MALIGNANT NEOPLASM OF BREAST: Primary | ICD-10-CM

## 2025-05-30 DIAGNOSIS — Z12.31 ENCOUNTER FOR SCREENING MAMMOGRAM FOR MALIGNANT NEOPLASM OF BREAST: ICD-10-CM

## 2025-05-30 DIAGNOSIS — Z85.3 PERSONAL HISTORY OF MALIGNANT NEOPLASM OF BREAST: ICD-10-CM

## 2025-05-30 DIAGNOSIS — Z17.0 MALIGNANT NEOPLASM OF LOWER-INNER QUADRANT OF LEFT BREAST IN FEMALE, ESTROGEN RECEPTOR POSITIVE: Primary | ICD-10-CM

## 2025-05-30 DIAGNOSIS — C50.312 MALIGNANT NEOPLASM OF LOWER-INNER QUADRANT OF LEFT BREAST IN FEMALE, ESTROGEN RECEPTOR POSITIVE: ICD-10-CM

## 2025-05-30 DIAGNOSIS — C50.312 MALIGNANT NEOPLASM OF LOWER-INNER QUADRANT OF LEFT BREAST IN FEMALE, ESTROGEN RECEPTOR POSITIVE: Primary | ICD-10-CM

## 2025-05-30 DIAGNOSIS — R92.8 ABNORMAL SCREENING MAMMOGRAM: ICD-10-CM

## 2025-05-30 DIAGNOSIS — Z17.0 MALIGNANT NEOPLASM OF LOWER-INNER QUADRANT OF LEFT BREAST IN FEMALE, ESTROGEN RECEPTOR POSITIVE: ICD-10-CM

## 2025-05-30 LAB
ALBUMIN SERPL-MCNC: 4.3 G/DL (ref 3.5–5.2)
ALBUMIN/GLOB SERPL: 1.3 G/DL
ALP SERPL-CCNC: 151 U/L (ref 39–117)
ALT SERPL W P-5'-P-CCNC: 27 U/L (ref 1–33)
ANION GAP SERPL CALCULATED.3IONS-SCNC: 8.3 MMOL/L (ref 5–15)
AST SERPL-CCNC: 29 U/L (ref 1–32)
BASOPHILS # BLD AUTO: 0.03 10*3/MM3 (ref 0–0.2)
BASOPHILS NFR BLD AUTO: 0.4 % (ref 0–1.5)
BILIRUB SERPL-MCNC: 0.4 MG/DL (ref 0–1.2)
BUN SERPL-MCNC: 18.4 MG/DL (ref 8–23)
BUN/CREAT SERPL: 20.2 (ref 7–25)
CALCIUM SPEC-SCNC: 9.7 MG/DL (ref 8.6–10.5)
CHLORIDE SERPL-SCNC: 105 MMOL/L (ref 98–107)
CO2 SERPL-SCNC: 28.7 MMOL/L (ref 22–29)
CREAT SERPL-MCNC: 0.91 MG/DL (ref 0.57–1)
DEPRECATED RDW RBC AUTO: 44.7 FL (ref 37–54)
EGFRCR SERPLBLD CKD-EPI 2021: 64.7 ML/MIN/1.73
EOSINOPHIL # BLD AUTO: 0.09 10*3/MM3 (ref 0–0.4)
EOSINOPHIL NFR BLD AUTO: 1.2 % (ref 0.3–6.2)
ERYTHROCYTE [DISTWIDTH] IN BLOOD BY AUTOMATED COUNT: 13.3 % (ref 12.3–15.4)
GLOBULIN UR ELPH-MCNC: 3.4 GM/DL
GLUCOSE SERPL-MCNC: 107 MG/DL (ref 65–99)
HCT VFR BLD AUTO: 42.6 % (ref 34–46.6)
HGB BLD-MCNC: 13.8 G/DL (ref 12–15.9)
HOLD SPECIMEN: NORMAL
LYMPHOCYTES # BLD AUTO: 1.69 10*3/MM3 (ref 0.7–3.1)
LYMPHOCYTES NFR BLD AUTO: 21.9 % (ref 19.6–45.3)
MCH RBC QN AUTO: 30.5 PG (ref 26.6–33)
MCHC RBC AUTO-ENTMCNC: 32.4 G/DL (ref 31.5–35.7)
MCV RBC AUTO: 94 FL (ref 79–97)
MONOCYTES # BLD AUTO: 0.56 10*3/MM3 (ref 0.1–0.9)
MONOCYTES NFR BLD AUTO: 7.3 % (ref 5–12)
NEUTROPHILS NFR BLD AUTO: 5.33 10*3/MM3 (ref 1.7–7)
NEUTROPHILS NFR BLD AUTO: 69.2 % (ref 42.7–76)
PLATELET # BLD AUTO: 234 10*3/MM3 (ref 140–450)
PMV BLD AUTO: 9.4 FL (ref 6–12)
POTASSIUM SERPL-SCNC: 4.2 MMOL/L (ref 3.5–5.2)
PROT SERPL-MCNC: 7.7 G/DL (ref 6–8.5)
RBC # BLD AUTO: 4.53 10*6/MM3 (ref 3.77–5.28)
SODIUM SERPL-SCNC: 142 MMOL/L (ref 136–145)
WBC NRBC COR # BLD AUTO: 7.7 10*3/MM3 (ref 3.4–10.8)

## 2025-05-30 PROCEDURE — 85025 COMPLETE CBC W/AUTO DIFF WBC: CPT

## 2025-05-30 PROCEDURE — 80053 COMPREHEN METABOLIC PANEL: CPT | Performed by: PHYSICIAN ASSISTANT

## 2025-05-30 PROCEDURE — 36415 COLL VENOUS BLD VENIPUNCTURE: CPT

## 2025-05-30 RX ORDER — MONTELUKAST SODIUM 10 MG/1
1 TABLET ORAL DAILY
COMMUNITY
End: 2025-05-30

## 2025-05-31 LAB — HER2/NEU RESULT: NORMAL

## 2025-06-02 LAB — CCV RESULT: NORMAL

## 2025-06-03 LAB
LAB AP CASE REPORT: NORMAL
LAB AP DIAGNOSIS COMMENT: NORMAL
LAB AP FISH HER2/NEU REPORT,ADDENDUM: NORMAL
LAB AP IHC HER2/NEU REPORT,ADDENDUM: NORMAL
LAB AP SPECIAL STAINS: NORMAL
PATH REPORT.FINAL DX SPEC: NORMAL
PATH REPORT.GROSS SPEC: NORMAL

## 2025-06-04 ENCOUNTER — TELEPHONE (OUTPATIENT)
Dept: SURGERY | Facility: CLINIC | Age: 80
End: 2025-06-04
Payer: MEDICARE

## 2025-06-04 NOTE — TELEPHONE ENCOUNTER
Hub staff attempted to follow warm transfer process and was unsuccessful     Caller: Shasha Freeman    Relationship to patient: Self    Best call back number: [phone]     Patient is needing: PT HAS A NEW REFERRAL IN AND CORRECTED AND SENT TO SCHED REVIEW, ATTEMPTED WT FOR SCHEDULING AND GOT ROUTED TO  - PLEASE CALL PT BACK TO SCHEDULE

## 2025-06-17 ENCOUNTER — OFFICE VISIT (OUTPATIENT)
Dept: SURGERY | Facility: CLINIC | Age: 80
End: 2025-06-17
Payer: MEDICARE

## 2025-06-17 VITALS
DIASTOLIC BLOOD PRESSURE: 95 MMHG | WEIGHT: 190.5 LBS | SYSTOLIC BLOOD PRESSURE: 163 MMHG | OXYGEN SATURATION: 97 % | BODY MASS INDEX: 28.87 KG/M2 | TEMPERATURE: 98.2 F | HEIGHT: 68 IN | HEART RATE: 77 BPM

## 2025-06-17 DIAGNOSIS — Z17.0 MALIGNANT NEOPLASM OF LOWER-INNER QUADRANT OF LEFT BREAST IN FEMALE, ESTROGEN RECEPTOR POSITIVE: Primary | ICD-10-CM

## 2025-06-17 DIAGNOSIS — C50.312 MALIGNANT NEOPLASM OF LOWER-INNER QUADRANT OF LEFT BREAST IN FEMALE, ESTROGEN RECEPTOR POSITIVE: Primary | ICD-10-CM

## 2025-06-17 PROBLEM — C50.919 BREAST CANCER: Status: ACTIVE | Noted: 2025-06-17

## 2025-06-17 RX ORDER — SODIUM CHLORIDE 0.9 % (FLUSH) 0.9 %
10 SYRINGE (ML) INJECTION EVERY 12 HOURS SCHEDULED
OUTPATIENT
Start: 2025-06-17

## 2025-06-17 RX ORDER — SODIUM CHLORIDE 0.9 % (FLUSH) 0.9 %
10 SYRINGE (ML) INJECTION AS NEEDED
OUTPATIENT
Start: 2025-06-17

## 2025-06-17 RX ORDER — SODIUM CHLORIDE 9 MG/ML
40 INJECTION, SOLUTION INTRAVENOUS AS NEEDED
OUTPATIENT
Start: 2025-06-17

## 2025-06-19 NOTE — PROGRESS NOTES
GENERAL SURGERY ESTABLISHED PATIENT NOTE    Patient Care Team:  Sherine Blackman MD as PCP - General (Family Medicine)  Jaime De Anda MD as Surgeon (General Surgery)  Kwasi Rich MD as Consulting Physician (Hematology and Oncology)    Reason for follow-up: 6-month follow-up from left lumpectomy and sentinel lymph node biopsy    Subjective   From 11/5/2024  Patient is a 80 y.o. female presents for 6-month follow-up after undergoing a left lumpectomy and sentinel lymph node biopsy on 10/20/2022.  The patient's pathology demonstrated residual moderately differentiated ductal carcinoma measuring 1.3 cm completely excised and 1 sentinel lymph node which was negative for malignancy.  The patient was seen by radiation oncology postoperatively and declined radiation therapy.  She was also seen by medical oncology and declined endocrine therapy.  She recently had a screening mammogram with tomosynthesis on the left April 2024 which demonstrated benign findings, no mammographic evidence of malignancy and routine screening mammography was recommended going forward.  Her past medical history is significant for breast cancer on the right side which was treated with a mastectomy and a TRAM flap many years ago.    From 6/17/2025  The patient presents to the office today with a new diagnosis of recurrent left breast cancer.  On her most recent imaging studies, at the 1 o'clock position of the left breast approximately 6 cm from the nipple the patient was found to have a new left breast mass.  Core needle biopsy of this demonstrated invasive well-differentiated ductal carcinoma, Ayaan grade 5 of 9, with the largest continuous invasive tumor focus measuring 0.5 cm.  No carcinoma in situ was identified.  The patient has negative lymph nodes in the left axilla.    Review of Systems   Genitourinary:  Negative for breast discharge, breast lump and breast pain.   Hematological:  Negative for adenopathy.         History  Past Medical History:   Diagnosis Date    Abnormal ECG     Murmur    Breast cancer left. Underwent lumpectomy. No adjuvant therapy per her own choice 2023    Breast cancer right ER positive. Treated with mastectomy and no adjuvant therapy 1995    Cholelithiasis     Didn’t need surgery    Colon polyp Years ago    Had removed    Dermatitis     ears    Diverticulitis of colon     Herpes simplex     about every 5 years    Hypertension     Macular degeneration     right eye  minimal    Osteopenia     Peripheral edema     with travel at times    Pulmonary arterial hypertension     Had for years     Past Surgical History:   Procedure Laterality Date    BREAST BIOPSY      BREAST LUMPECTOMY Left 10/20/2022    Procedure: BREAST LUMPECTOMY;  Surgeon: Jaime De Anda MD;  Location: [facility] MAIN OR;  Service: General;  Laterality: Left;    BREAST RECONSTRUCTION Right     CATARACT EXTRACTION Bilateral     with IOL    HYSTERECTOMY  01/12/2023    SENTINEL NODE BIOPSY Left 10/20/2022    Procedure: SENTINEL NODE BIOPSY;  Surgeon: Jaime De Anda MD;  Location: [facility] MAIN OR;  Service: General;  Laterality: Left;    SIMPLE MASTECTOMY Right     1995    TONSILLECTOMY      TOOTH EXTRACTION       Family History   Problem Relation Age of Onset    Lymphoma Mother         Non-hodgkins lymphoma    Heart disease Father     Colon cancer Maternal Uncle      Social History     Tobacco Use    Smoking status: Never     Passive exposure: Never    Smokeless tobacco: Never   Vaping Use    Vaping status: Never Used   Substance Use Topics    Alcohol use: Never    Drug use: Never     (Not in a hospital admission)    Allergies:  Statins, Acetaminophen, and Latex    Objective     Vital Signs       Physical Exam  Vitals reviewed. Exam conducted with a chaperone present.   Constitutional:       Appearance: She is well-developed.   HENT:      Head: Normocephalic and atraumatic.   Eyes:      Pupils: Pupils are equal, round, and  reactive to light.   Cardiovascular:      Rate and Rhythm: Normal rate and regular rhythm.   Pulmonary:      Effort: Pulmonary effort is normal.      Breath sounds: Normal breath sounds.   Chest:      Comments: Both breasts were examined the upright and supine position.  Both breasts exhibit normal shape and contour.  The right breast is status postmastectomy with changes of a TRAM flap noted. She does have a quarter sized area of green resolving ecchymosis on at the 10 o'clock position of her TRAM flap.  There are no palpable subcutaneous masses noted.  The left breast has a well-healed curvilinear incision on the upper outer aspect of the nipple areolar complex and a well-healed transverse incision in the left axilla.  No overlying skin changes are noted, no palpable masses, no discharge from the nipple.  Abdominal:      General: There is no distension.      Palpations: Abdomen is soft.      Tenderness: There is no abdominal tenderness.      Hernia: No hernia is present.   Musculoskeletal:         General: Normal range of motion.      Cervical back: Normal range of motion.   Lymphadenopathy:      Cervical: No cervical adenopathy.      Upper Body:      Right upper body: No supraclavicular or axillary adenopathy.      Left upper body: No supraclavicular or axillary adenopathy.   Skin:     General: Skin is warm and dry.      Findings: No rash.   Neurological:      Mental Status: She is alert and oriented to person, place, and time.         Results Review:   Lab Results (last 24 hours)       ** No results found for the last 24 hours. **          No radiology results for the last day      I reviewed the patient's new imaging results and agree with the interpretation.  I reviewed the patient's other test results and agree with the interpretation    Assessment & Plan   Recurrent left breast cancer    We reviewed the patient's imaging, and her pathology reports in detail.  We discussed that breast cancer is treated in a  multidisciplinary fashion, with input from radiation oncology, medical oncology, and general surgery.  We discussed that the patient's case will be discussed at a multidisciplinary tumor board meeting held every other week.  We then discussed the diagnosis of breast cancer and that most breast cancers arise either from the ducts or from the lobules.  Using visual aids, we discussed the difference between invasive and in situ disease, especially whether or not the lymph nodes need to be evaluated.  Usually, with in situ disease, lymph nodes are not examined.  However, examining the lymph nodes should be considered if the area of concern is widespread or if it is high-grade.  We also discussed hormone receptors, and discussed that there are medications which can be given if the hormone receptors were positive which can be used to treat the cancer, and to provide protection in not only the breast with cancer, but the contralateral breast as well.  The surgical options were discussed with the patient which include breast conservation therapy (lumpectomy with radiation) and mastectomy.  With regards to mastectomy, we discussed that reconstruction may be performed either at the time of the surgery, or in a delayed fashion.  We discussed that the survival benefit between these 2 procedures are equivalent, and therefore they are considered oncologically appropriate.  However, some women will choose one versus the other for a multitude of factors.  There are times when a lumpectomy is not a feasible option, these include but are not limited to tumor to breast ratio, the location of the tumor, previous radiation to the chest wall, or connective tissue disorders which would make radiation treatment ineffective.  This patient has elected to undergo mastectomy and understands that this may include an evaluation of her lymph nodes.  She understands that there may or may not be a role for radiation therapy following surgery.   There may or may not be a role for chemotherapy or hormonal therapy following her surgery as well.  This will be managed by medical oncology.  We discussed the risk, benefits, and alternatives to surgery which include but are not limited to: Bleeding, infection, damage to nerves/blood vessels, and pain.  Following surgery, the patient will have drains in place which will need to be managed at home.  These drains will be removed once the output is minimal and clear.  Briefly discussed with patient the option of proceeding with reconstruction.  The patient is not interested in reconstruction.  The patient would benefit from endocrine therapy going forward and that was discussed with the patient.  If she undergoes a mastectomy and does not have any evidence of lymph node metastasis, it is likely that she will not require any radiation therapy    The patient understands, and agrees to proceed with left mastectomy and sentinel lymph node biopsy.    I discussed the patients findings and my recommendations with the patient.     Jaime De Anda MD  06/19/25  15:04 EDT

## 2025-06-19 NOTE — H&P (VIEW-ONLY)
GENERAL SURGERY ESTABLISHED PATIENT NOTE    Patient Care Team:  Sherine Blackman MD as PCP - General (Family Medicine)  Jaime De Anda MD as Surgeon (General Surgery)  Kwasi Rich MD as Consulting Physician (Hematology and Oncology)    Reason for follow-up: 6-month follow-up from left lumpectomy and sentinel lymph node biopsy    Subjective   From 11/5/2024  Patient is a 80 y.o. female presents for 6-month follow-up after undergoing a left lumpectomy and sentinel lymph node biopsy on 10/20/2022.  The patient's pathology demonstrated residual moderately differentiated ductal carcinoma measuring 1.3 cm completely excised and 1 sentinel lymph node which was negative for malignancy.  The patient was seen by radiation oncology postoperatively and declined radiation therapy.  She was also seen by medical oncology and declined endocrine therapy.  She recently had a screening mammogram with tomosynthesis on the left April 2024 which demonstrated benign findings, no mammographic evidence of malignancy and routine screening mammography was recommended going forward.  Her past medical history is significant for breast cancer on the right side which was treated with a mastectomy and a TRAM flap many years ago.    From 6/17/2025  The patient presents to the office today with a new diagnosis of recurrent left breast cancer.  On her most recent imaging studies, at the 1 o'clock position of the left breast approximately 6 cm from the nipple the patient was found to have a new left breast mass.  Core needle biopsy of this demonstrated invasive well-differentiated ductal carcinoma, Ayaan grade 5 of 9, with the largest continuous invasive tumor focus measuring 0.5 cm.  No carcinoma in situ was identified.  The patient has negative lymph nodes in the left axilla.    Review of Systems   Genitourinary:  Negative for breast discharge, breast lump and breast pain.   Hematological:  Negative for adenopathy.         History  Past Medical History:   Diagnosis Date    Abnormal ECG     Murmur    Breast cancer left. Underwent lumpectomy. No adjuvant therapy per her own choice 2023    Breast cancer right ER positive. Treated with mastectomy and no adjuvant therapy 1995    Cholelithiasis     Didn’t need surgery    Colon polyp Years ago    Had removed    Dermatitis     ears    Diverticulitis of colon     Herpes simplex     about every 5 years    Hypertension     Macular degeneration     right eye  minimal    Osteopenia     Peripheral edema     with travel at times    Pulmonary arterial hypertension     Had for years     Past Surgical History:   Procedure Laterality Date    BREAST BIOPSY      BREAST LUMPECTOMY Left 10/20/2022    Procedure: BREAST LUMPECTOMY;  Surgeon: Jaime De Anda MD;  Location: [facility] MAIN OR;  Service: General;  Laterality: Left;    BREAST RECONSTRUCTION Right     CATARACT EXTRACTION Bilateral     with IOL    HYSTERECTOMY  01/12/2023    SENTINEL NODE BIOPSY Left 10/20/2022    Procedure: SENTINEL NODE BIOPSY;  Surgeon: Jaime De Anda MD;  Location: [facility] MAIN OR;  Service: General;  Laterality: Left;    SIMPLE MASTECTOMY Right     1995    TONSILLECTOMY      TOOTH EXTRACTION       Family History   Problem Relation Age of Onset    Lymphoma Mother         Non-hodgkins lymphoma    Heart disease Father     Colon cancer Maternal Uncle      Social History     Tobacco Use    Smoking status: Never     Passive exposure: Never    Smokeless tobacco: Never   Vaping Use    Vaping status: Never Used   Substance Use Topics    Alcohol use: Never    Drug use: Never     (Not in a hospital admission)    Allergies:  Statins, Acetaminophen, and Latex    Objective     Vital Signs       Physical Exam  Vitals reviewed. Exam conducted with a chaperone present.   Constitutional:       Appearance: She is well-developed.   HENT:      Head: Normocephalic and atraumatic.   Eyes:      Pupils: Pupils are equal, round, and  reactive to light.   Cardiovascular:      Rate and Rhythm: Normal rate and regular rhythm.   Pulmonary:      Effort: Pulmonary effort is normal.      Breath sounds: Normal breath sounds.   Chest:      Comments: Both breasts were examined the upright and supine position.  Both breasts exhibit normal shape and contour.  The right breast is status postmastectomy with changes of a TRAM flap noted. She does have a quarter sized area of green resolving ecchymosis on at the 10 o'clock position of her TRAM flap.  There are no palpable subcutaneous masses noted.  The left breast has a well-healed curvilinear incision on the upper outer aspect of the nipple areolar complex and a well-healed transverse incision in the left axilla.  No overlying skin changes are noted, no palpable masses, no discharge from the nipple.  Abdominal:      General: There is no distension.      Palpations: Abdomen is soft.      Tenderness: There is no abdominal tenderness.      Hernia: No hernia is present.   Musculoskeletal:         General: Normal range of motion.      Cervical back: Normal range of motion.   Lymphadenopathy:      Cervical: No cervical adenopathy.      Upper Body:      Right upper body: No supraclavicular or axillary adenopathy.      Left upper body: No supraclavicular or axillary adenopathy.   Skin:     General: Skin is warm and dry.      Findings: No rash.   Neurological:      Mental Status: She is alert and oriented to person, place, and time.         Results Review:   Lab Results (last 24 hours)       ** No results found for the last 24 hours. **          No radiology results for the last day      I reviewed the patient's new imaging results and agree with the interpretation.  I reviewed the patient's other test results and agree with the interpretation    Assessment & Plan   Recurrent left breast cancer    We reviewed the patient's imaging, and her pathology reports in detail.  We discussed that breast cancer is treated in a  multidisciplinary fashion, with input from radiation oncology, medical oncology, and general surgery.  We discussed that the patient's case will be discussed at a multidisciplinary tumor board meeting held every other week.  We then discussed the diagnosis of breast cancer and that most breast cancers arise either from the ducts or from the lobules.  Using visual aids, we discussed the difference between invasive and in situ disease, especially whether or not the lymph nodes need to be evaluated.  Usually, with in situ disease, lymph nodes are not examined.  However, examining the lymph nodes should be considered if the area of concern is widespread or if it is high-grade.  We also discussed hormone receptors, and discussed that there are medications which can be given if the hormone receptors were positive which can be used to treat the cancer, and to provide protection in not only the breast with cancer, but the contralateral breast as well.  The surgical options were discussed with the patient which include breast conservation therapy (lumpectomy with radiation) and mastectomy.  With regards to mastectomy, we discussed that reconstruction may be performed either at the time of the surgery, or in a delayed fashion.  We discussed that the survival benefit between these 2 procedures are equivalent, and therefore they are considered oncologically appropriate.  However, some women will choose one versus the other for a multitude of factors.  There are times when a lumpectomy is not a feasible option, these include but are not limited to tumor to breast ratio, the location of the tumor, previous radiation to the chest wall, or connective tissue disorders which would make radiation treatment ineffective.  This patient has elected to undergo mastectomy and understands that this may include an evaluation of her lymph nodes.  She understands that there may or may not be a role for radiation therapy following surgery.   There may or may not be a role for chemotherapy or hormonal therapy following her surgery as well.  This will be managed by medical oncology.  We discussed the risk, benefits, and alternatives to surgery which include but are not limited to: Bleeding, infection, damage to nerves/blood vessels, and pain.  Following surgery, the patient will have drains in place which will need to be managed at home.  These drains will be removed once the output is minimal and clear.  Briefly discussed with patient the option of proceeding with reconstruction.  The patient is not interested in reconstruction.  The patient would benefit from endocrine therapy going forward and that was discussed with the patient.  If she undergoes a mastectomy and does not have any evidence of lymph node metastasis, it is likely that she will not require any radiation therapy    The patient understands, and agrees to proceed with left mastectomy and sentinel lymph node biopsy.    I discussed the patients findings and my recommendations with the patient.     Jaime De Anda MD  06/19/25  15:04 EDT

## 2025-06-20 ENCOUNTER — HOSPITAL ENCOUNTER (OUTPATIENT)
Dept: CARDIOLOGY | Facility: HOSPITAL | Age: 80
Discharge: HOME | End: 2025-06-20
Payer: MEDICARE

## 2025-06-20 ENCOUNTER — LAB (OUTPATIENT)
Dept: LAB | Facility: HOSPITAL | Age: 80
End: 2025-06-20
Payer: MEDICARE

## 2025-06-20 ENCOUNTER — HOSPITAL ENCOUNTER (OUTPATIENT)
Dept: GENERAL RADIOLOGY | Facility: HOSPITAL | Age: 80
Discharge: HOME | End: 2025-06-20
Payer: MEDICARE

## 2025-06-20 DIAGNOSIS — C50.312 MALIGNANT NEOPLASM OF LOWER-INNER QUADRANT OF LEFT BREAST IN FEMALE, ESTROGEN RECEPTOR POSITIVE: ICD-10-CM

## 2025-06-20 DIAGNOSIS — Z17.0 MALIGNANT NEOPLASM OF LOWER-INNER QUADRANT OF LEFT BREAST IN FEMALE, ESTROGEN RECEPTOR POSITIVE: ICD-10-CM

## 2025-06-20 LAB
ABO GROUP BLD: NORMAL
ALBUMIN SERPL-MCNC: 4 G/DL (ref 3.5–5.2)
ALBUMIN/GLOB SERPL: 1.1 G/DL
ALP SERPL-CCNC: 140 U/L (ref 39–117)
ALT SERPL W P-5'-P-CCNC: 24 U/L (ref 1–33)
ANION GAP SERPL CALCULATED.3IONS-SCNC: 8.4 MMOL/L (ref 5–15)
AST SERPL-CCNC: 25 U/L (ref 1–32)
BASOPHILS # BLD AUTO: 0.05 10*3/MM3 (ref 0–0.2)
BASOPHILS NFR BLD AUTO: 0.7 % (ref 0–1.5)
BILIRUB SERPL-MCNC: 0.4 MG/DL (ref 0–1.2)
BLD GP AB SCN SERPL QL: NEGATIVE
BUN SERPL-MCNC: 22 MG/DL (ref 8–23)
BUN/CREAT SERPL: 23.2 (ref 7–25)
CALCIUM SPEC-SCNC: 9.4 MG/DL (ref 8.6–10.5)
CHLORIDE SERPL-SCNC: 105 MMOL/L (ref 98–107)
CO2 SERPL-SCNC: 27.6 MMOL/L (ref 22–29)
CREAT SERPL-MCNC: 0.95 MG/DL (ref 0.57–1)
DEPRECATED RDW RBC AUTO: 43.1 FL (ref 37–54)
EGFRCR SERPLBLD CKD-EPI 2021: 61.5 ML/MIN/1.73
EOSINOPHIL # BLD AUTO: 0.13 10*3/MM3 (ref 0–0.4)
EOSINOPHIL NFR BLD AUTO: 1.9 % (ref 0.3–6.2)
ERYTHROCYTE [DISTWIDTH] IN BLOOD BY AUTOMATED COUNT: 12.7 % (ref 12.3–15.4)
GLOBULIN UR ELPH-MCNC: 3.5 GM/DL
GLUCOSE SERPL-MCNC: 87 MG/DL (ref 65–99)
HBA1C MFR BLD: 5.33 % (ref 4.8–5.6)
HCT VFR BLD AUTO: 42.6 % (ref 34–46.6)
HGB BLD-MCNC: 13.3 G/DL (ref 12–15.9)
IMM GRANULOCYTES # BLD AUTO: 0.03 10*3/MM3 (ref 0–0.05)
IMM GRANULOCYTES NFR BLD AUTO: 0.4 % (ref 0–0.5)
LYMPHOCYTES # BLD AUTO: 1.34 10*3/MM3 (ref 0.7–3.1)
LYMPHOCYTES NFR BLD AUTO: 20.1 % (ref 19.6–45.3)
MCH RBC QN AUTO: 29 PG (ref 26.6–33)
MCHC RBC AUTO-ENTMCNC: 31.2 G/DL (ref 31.5–35.7)
MCV RBC AUTO: 92.8 FL (ref 79–97)
MONOCYTES # BLD AUTO: 0.48 10*3/MM3 (ref 0.1–0.9)
MONOCYTES NFR BLD AUTO: 7.2 % (ref 5–12)
MRSA DNA SPEC QL NAA+PROBE: NORMAL
NEUTROPHILS NFR BLD AUTO: 4.64 10*3/MM3 (ref 1.7–7)
NEUTROPHILS NFR BLD AUTO: 69.7 % (ref 42.7–76)
NRBC BLD AUTO-RTO: 0 /100 WBC (ref 0–0.2)
PLATELET # BLD AUTO: 261 10*3/MM3 (ref 140–450)
PMV BLD AUTO: 10 FL (ref 6–12)
POTASSIUM SERPL-SCNC: 4.5 MMOL/L (ref 3.5–5.2)
PROT SERPL-MCNC: 7.5 G/DL (ref 6–8.5)
RBC # BLD AUTO: 4.59 10*6/MM3 (ref 3.77–5.28)
RH BLD: POSITIVE
SODIUM SERPL-SCNC: 141 MMOL/L (ref 136–145)
T&S EXPIRATION DATE: NORMAL
WBC NRBC COR # BLD AUTO: 6.67 10*3/MM3 (ref 3.4–10.8)

## 2025-06-20 PROCEDURE — 86900 BLOOD TYPING SEROLOGIC ABO: CPT

## 2025-06-20 PROCEDURE — 86901 BLOOD TYPING SEROLOGIC RH(D): CPT

## 2025-06-20 PROCEDURE — 93005 ELECTROCARDIOGRAM TRACING: CPT | Performed by: SURGERY

## 2025-06-20 PROCEDURE — 36415 COLL VENOUS BLD VENIPUNCTURE: CPT

## 2025-06-20 PROCEDURE — 85025 COMPLETE CBC W/AUTO DIFF WBC: CPT

## 2025-06-20 PROCEDURE — 86850 RBC ANTIBODY SCREEN: CPT

## 2025-06-20 PROCEDURE — 80053 COMPREHEN METABOLIC PANEL: CPT

## 2025-06-20 PROCEDURE — 71045 X-RAY EXAM CHEST 1 VIEW: CPT

## 2025-06-20 PROCEDURE — 87641 MR-STAPH DNA AMP PROBE: CPT

## 2025-06-20 PROCEDURE — 83036 HEMOGLOBIN GLYCOSYLATED A1C: CPT

## 2025-06-22 LAB
QT INTERVAL: 405 MS
QTC INTERVAL: 420 MS

## 2025-06-25 ENCOUNTER — ANESTHESIA EVENT (OUTPATIENT)
Dept: PERIOP | Facility: HOSPITAL | Age: 80
End: 2025-06-25
Payer: MEDICARE

## 2025-06-25 ENCOUNTER — TELEPHONE (OUTPATIENT)
Dept: ONCOLOGY | Facility: CLINIC | Age: 80
End: 2025-06-25
Payer: MEDICARE

## 2025-06-25 NOTE — TELEPHONE ENCOUNTER
Caller: Shasha Freeman    Relationship to patient: Self    Best call back number: [phone]     Chief complaint: PATIENT TO RESCHEDULE 7/2/25 APPT FOR FURTHER OUT AFTER 6/26/25 SURGERY    Type of visit: LAB AND FU1    Requested date: PATIENT REQUESTING TO MOVE OUT AT LEAST 1-2 WEEKS

## 2025-06-25 NOTE — ANESTHESIA PREPROCEDURE EVALUATION
Anesthesia Evaluation     Patient summary reviewed and Nursing notes reviewed   no history of anesthetic complications:   NPO Solid Status: > 8 hours  NPO Liquid Status: > 2 hours           Airway   Dental      Pulmonary    Cardiovascular     ECG reviewed  PT is on anticoagulation therapy  Patient on routine beta blocker    (+) hypertension, valvular problems/murmurs MR, hyperlipidemia      Neuro/Psych  GI/Hepatic/Renal/Endo    (+) obesity    Musculoskeletal     Abdominal    Substance History      OB/GYN          Other      history of cancer    ROS/Med Hx Other: Additional History:  Breast cancer, pulmonary hypertension, edema    PSH:  SIMPLE MASTECTOMY TONSILLECTOMY  TOOTH EXTRACTION CATARACT EXTRACTION  BREAST RECONSTRUCTION BREAST LUMPECTOMY  SENTINEL NODE BIOPSY HYSTERECTOMY  BREAST BIOPSY               Anesthesia Plan    ASA 3     general with block     (Patient identified; pre-operative vital signs, all relevant labs/studies, complete medical/surgical/anesthetic history, full medication list, full allergy list, and NPO status obtained/reviewed; physical assessment performed; anesthetic options, side effects, potential complications, risks, and benefits discussed; questions answered; verbal and/or written anesthesia consent obtained; patient cleared for procedure; anesthesia machine and equipment checked and functioning)  intravenous induction     Anesthetic plan, risks, benefits, and alternatives have been provided, discussed and informed consent has been obtained with: patient.    Use of blood products discussed with  Consented to blood products.    Plan discussed with CRNA and CAA.    CODE STATUS:

## 2025-06-26 ENCOUNTER — HOSPITAL ENCOUNTER (OUTPATIENT)
Dept: NUCLEAR MEDICINE | Facility: HOSPITAL | Age: 80
Discharge: HOME | End: 2025-06-26
Payer: MEDICARE

## 2025-06-26 ENCOUNTER — HOSPITAL ENCOUNTER (OUTPATIENT)
Dept: NUCLEAR MEDICINE | Facility: HOSPITAL | Age: 80
End: 2025-06-26
Payer: MEDICARE

## 2025-06-26 ENCOUNTER — ANESTHESIA (OUTPATIENT)
Dept: PERIOP | Facility: HOSPITAL | Age: 80
End: 2025-06-26
Payer: MEDICARE

## 2025-06-26 ENCOUNTER — HOSPITAL ENCOUNTER (OUTPATIENT)
Facility: HOSPITAL | Age: 80
Discharge: HOME | End: 2025-06-27
Attending: SURGERY | Admitting: SURGERY
Payer: MEDICARE

## 2025-06-26 ENCOUNTER — ANESTHESIA EVENT CONVERTED (OUTPATIENT)
Dept: ANESTHESIOLOGY | Facility: HOSPITAL | Age: 80
End: 2025-06-26
Payer: MEDICARE

## 2025-06-26 DIAGNOSIS — C50.312 MALIGNANT NEOPLASM OF LOWER-INNER QUADRANT OF LEFT BREAST IN FEMALE, ESTROGEN RECEPTOR POSITIVE: ICD-10-CM

## 2025-06-26 DIAGNOSIS — Z17.0 MALIGNANT NEOPLASM OF LOWER-INNER QUADRANT OF LEFT BREAST IN FEMALE, ESTROGEN RECEPTOR POSITIVE: ICD-10-CM

## 2025-06-26 PROCEDURE — 38525 BIOPSY/REMOVAL LYMPH NODES: CPT | Performed by: SURGERY

## 2025-06-26 PROCEDURE — A9270 NON-COVERED ITEM OR SERVICE: HCPCS | Performed by: SURGERY

## 2025-06-26 PROCEDURE — 38792 RA TRACER ID OF SENTINL NODE: CPT

## 2025-06-26 PROCEDURE — 25010000002 DEXAMETHASONE PER 1 MG

## 2025-06-26 PROCEDURE — 88342 IMHCHEM/IMCYTCHM 1ST ANTB: CPT | Performed by: SURGERY

## 2025-06-26 PROCEDURE — 25010000002 FAMOTIDINE 10 MG/ML SOLUTION: Performed by: SURGERY

## 2025-06-26 PROCEDURE — 25010000002 FENTANYL CITRATE (PF) 100 MCG/2ML SOLUTION

## 2025-06-26 PROCEDURE — 25010000002 GLYCOPYRROLATE 0.2 MG/ML SOLUTION

## 2025-06-26 PROCEDURE — 63710000001 NEBIVOLOL 10 MG TABLET: Performed by: SURGERY

## 2025-06-26 PROCEDURE — 25010000002 PROPOFOL 10 MG/ML EMULSION

## 2025-06-26 PROCEDURE — 25010000002 LIDOCAINE PF 2% 2 % SOLUTION

## 2025-06-26 PROCEDURE — 25010000002 BUPIVACAINE LIPOSOME 1.3 % SUSPENSION: Performed by: ANESTHESIOLOGY

## 2025-06-26 PROCEDURE — 63710000001 MULTIVITAMIN TABLET: Performed by: SURGERY

## 2025-06-26 PROCEDURE — 38900 IO MAP OF SENT LYMPH NODE: CPT | Performed by: SURGERY

## 2025-06-26 PROCEDURE — 25010000002 CEFAZOLIN PER 500 MG: Performed by: SURGERY

## 2025-06-26 PROCEDURE — 88309 TISSUE EXAM BY PATHOLOGIST: CPT | Performed by: SURGERY

## 2025-06-26 PROCEDURE — 25010000002 BUPIVACAINE (PF) 0.5 % SOLUTION: Performed by: ANESTHESIOLOGY

## 2025-06-26 PROCEDURE — 19303 MAST SIMPLE COMPLETE: CPT

## 2025-06-26 PROCEDURE — A9520 TC99 TILMANOCEPT DIAG 0.5MCI: HCPCS | Performed by: SURGERY

## 2025-06-26 PROCEDURE — 25010000002 METHYLENE BLUE 50 MG/10ML SOLUTION: Performed by: SURGERY

## 2025-06-26 PROCEDURE — 19303 MAST SIMPLE COMPLETE: CPT | Performed by: SURGERY

## 2025-06-26 PROCEDURE — 25810000003 LACTATED RINGERS PER 1000 ML

## 2025-06-26 PROCEDURE — 63710000001 ASPIRIN 81 MG CHEWABLE TABLET: Performed by: SURGERY

## 2025-06-26 PROCEDURE — 34310000005 TECHETIUM TC99M TILMANOCEPT: Performed by: SURGERY

## 2025-06-26 PROCEDURE — 63710000001 SPIRONOLACTONE 25 MG TABLET: Performed by: SURGERY

## 2025-06-26 PROCEDURE — 25010000002 ONDANSETRON PER 1 MG

## 2025-06-26 PROCEDURE — 25010000002 MIDAZOLAM PER 1 MG: Performed by: ANESTHESIOLOGY

## 2025-06-26 PROCEDURE — 63710000001 CHOLECALCIFEROL 25 MCG (1000 UT) TABLET: Performed by: SURGERY

## 2025-06-26 RX ORDER — OXYCODONE HYDROCHLORIDE 5 MG/1
5 TABLET ORAL ONCE AS NEEDED
Status: DISCONTINUED | OUTPATIENT
Start: 2025-06-26 | End: 2025-06-26

## 2025-06-26 RX ORDER — LIDOCAINE HYDROCHLORIDE 20 MG/ML
INJECTION, SOLUTION EPIDURAL; INFILTRATION; INTRACAUDAL; PERINEURAL AS NEEDED
Status: DISCONTINUED | OUTPATIENT
Start: 2025-06-26 | End: 2025-06-26 | Stop reason: SURG

## 2025-06-26 RX ORDER — CHOLECALCIFEROL (VITAMIN D3) 25 MCG
1000 TABLET ORAL EVERY EVENING
Status: DISCONTINUED | OUTPATIENT
Start: 2025-06-26 | End: 2025-06-27 | Stop reason: HOSPADM

## 2025-06-26 RX ORDER — DIAZEPAM 2 MG/1
2 TABLET ORAL EVERY 6 HOURS PRN
Status: DISCONTINUED | OUTPATIENT
Start: 2025-06-26 | End: 2025-06-27 | Stop reason: HOSPADM

## 2025-06-26 RX ORDER — SULFAMETHOXAZOLE AND TRIMETHOPRIM 800; 160 MG/1; MG/1
1 TABLET ORAL 2 TIMES DAILY
COMMUNITY
Start: 2025-06-20 | End: 2025-06-27

## 2025-06-26 RX ORDER — SODIUM CHLORIDE 0.9 % (FLUSH) 0.9 %
10 SYRINGE (ML) INJECTION EVERY 12 HOURS SCHEDULED
Status: DISCONTINUED | OUTPATIENT
Start: 2025-06-26 | End: 2025-06-26 | Stop reason: HOSPADM

## 2025-06-26 RX ORDER — HYDROCODONE BITARTRATE AND ACETAMINOPHEN 5; 325 MG/1; MG/1
1 TABLET ORAL EVERY 4 HOURS PRN
Status: DISCONTINUED | OUTPATIENT
Start: 2025-06-26 | End: 2025-06-27 | Stop reason: HOSPADM

## 2025-06-26 RX ORDER — EPHEDRINE SULFATE 5 MG/ML
INJECTION INTRAVENOUS AS NEEDED
Status: DISCONTINUED | OUTPATIENT
Start: 2025-06-26 | End: 2025-06-26 | Stop reason: SURG

## 2025-06-26 RX ORDER — ALBUTEROL SULFATE 0.83 MG/ML
2.5 SOLUTION RESPIRATORY (INHALATION) ONCE AS NEEDED
Status: DISCONTINUED | OUTPATIENT
Start: 2025-06-26 | End: 2025-06-26

## 2025-06-26 RX ORDER — SODIUM CHLORIDE 0.9 % (FLUSH) 0.9 %
10 SYRINGE (ML) INJECTION AS NEEDED
Status: DISCONTINUED | OUTPATIENT
Start: 2025-06-26 | End: 2025-06-26 | Stop reason: HOSPADM

## 2025-06-26 RX ORDER — PROMETHAZINE HYDROCHLORIDE 25 MG/1
12.5 SUPPOSITORY RECTAL EVERY 6 HOURS PRN
Status: DISCONTINUED | OUTPATIENT
Start: 2025-06-26 | End: 2025-06-27 | Stop reason: HOSPADM

## 2025-06-26 RX ORDER — ONDANSETRON 2 MG/ML
4 INJECTION INTRAMUSCULAR; INTRAVENOUS ONCE AS NEEDED
Status: DISCONTINUED | OUTPATIENT
Start: 2025-06-26 | End: 2025-06-26

## 2025-06-26 RX ORDER — SPIRONOLACTONE 25 MG/1
25 TABLET ORAL DAILY
Status: DISCONTINUED | OUTPATIENT
Start: 2025-06-26 | End: 2025-06-27 | Stop reason: HOSPADM

## 2025-06-26 RX ORDER — FENTANYL CITRATE 50 UG/ML
50 INJECTION, SOLUTION INTRAMUSCULAR; INTRAVENOUS
Status: DISCONTINUED | OUTPATIENT
Start: 2025-06-26 | End: 2025-06-26

## 2025-06-26 RX ORDER — FENTANYL CITRATE 50 UG/ML
INJECTION, SOLUTION INTRAMUSCULAR; INTRAVENOUS AS NEEDED
Status: DISCONTINUED | OUTPATIENT
Start: 2025-06-26 | End: 2025-06-26 | Stop reason: SURG

## 2025-06-26 RX ORDER — NALOXONE HCL 0.4 MG/ML
0.1 VIAL (ML) INJECTION
Status: DISCONTINUED | OUTPATIENT
Start: 2025-06-26 | End: 2025-06-27 | Stop reason: HOSPADM

## 2025-06-26 RX ORDER — DIPHENHYDRAMINE HYDROCHLORIDE 50 MG/ML
12.5 INJECTION, SOLUTION INTRAMUSCULAR; INTRAVENOUS
Status: DISCONTINUED | OUTPATIENT
Start: 2025-06-26 | End: 2025-06-26

## 2025-06-26 RX ORDER — GLYCOPYRROLATE 0.2 MG/ML
INJECTION INTRAMUSCULAR; INTRAVENOUS AS NEEDED
Status: DISCONTINUED | OUTPATIENT
Start: 2025-06-26 | End: 2025-06-26 | Stop reason: SURG

## 2025-06-26 RX ORDER — HYDROCODONE BITARTRATE AND ACETAMINOPHEN 5; 325 MG/1; MG/1
2 TABLET ORAL EVERY 4 HOURS PRN
Status: DISCONTINUED | OUTPATIENT
Start: 2025-06-26 | End: 2025-06-27 | Stop reason: HOSPADM

## 2025-06-26 RX ORDER — SODIUM CHLORIDE 9 MG/ML
40 INJECTION, SOLUTION INTRAVENOUS AS NEEDED
Status: DISCONTINUED | OUTPATIENT
Start: 2025-06-26 | End: 2025-06-26 | Stop reason: HOSPADM

## 2025-06-26 RX ORDER — MIDAZOLAM HYDROCHLORIDE 1 MG/ML
INJECTION, SOLUTION INTRAMUSCULAR; INTRAVENOUS AS NEEDED
Status: DISCONTINUED | OUTPATIENT
Start: 2025-06-26 | End: 2025-06-26 | Stop reason: SURG

## 2025-06-26 RX ORDER — DEXAMETHASONE SODIUM PHOSPHATE 4 MG/ML
INJECTION, SOLUTION INTRA-ARTICULAR; INTRALESIONAL; INTRAMUSCULAR; INTRAVENOUS; SOFT TISSUE AS NEEDED
Status: DISCONTINUED | OUTPATIENT
Start: 2025-06-26 | End: 2025-06-26 | Stop reason: SURG

## 2025-06-26 RX ORDER — HYDROMORPHONE HYDROCHLORIDE 1 MG/ML
0.25 INJECTION, SOLUTION INTRAMUSCULAR; INTRAVENOUS; SUBCUTANEOUS
Status: DISCONTINUED | OUTPATIENT
Start: 2025-06-26 | End: 2025-06-26

## 2025-06-26 RX ORDER — ONDANSETRON 4 MG/1
4 TABLET, ORALLY DISINTEGRATING ORAL EVERY 6 HOURS PRN
Status: DISCONTINUED | OUTPATIENT
Start: 2025-06-26 | End: 2025-06-27 | Stop reason: HOSPADM

## 2025-06-26 RX ORDER — NEBIVOLOL 10 MG/1
10 TABLET ORAL EVERY EVENING
Status: DISCONTINUED | OUTPATIENT
Start: 2025-06-26 | End: 2025-06-27 | Stop reason: HOSPADM

## 2025-06-26 RX ORDER — ASPIRIN 81 MG/1
81 TABLET, CHEWABLE ORAL DAILY
Status: DISCONTINUED | OUTPATIENT
Start: 2025-06-26 | End: 2025-06-27 | Stop reason: HOSPADM

## 2025-06-26 RX ORDER — SODIUM CHLORIDE, SODIUM LACTATE, POTASSIUM CHLORIDE, CALCIUM CHLORIDE 600; 310; 30; 20 MG/100ML; MG/100ML; MG/100ML; MG/100ML
INJECTION, SOLUTION INTRAVENOUS CONTINUOUS PRN
Status: DISCONTINUED | OUTPATIENT
Start: 2025-06-26 | End: 2025-06-26 | Stop reason: SURG

## 2025-06-26 RX ORDER — DIPHENOXYLATE HYDROCHLORIDE AND ATROPINE SULFATE 2.5; .025 MG/1; MG/1
1 TABLET ORAL DAILY
Status: DISCONTINUED | OUTPATIENT
Start: 2025-06-26 | End: 2025-06-27 | Stop reason: HOSPADM

## 2025-06-26 RX ORDER — PROMETHAZINE HYDROCHLORIDE 25 MG/1
12.5 TABLET ORAL EVERY 6 HOURS PRN
Status: DISCONTINUED | OUTPATIENT
Start: 2025-06-26 | End: 2025-06-27 | Stop reason: HOSPADM

## 2025-06-26 RX ORDER — NALOXONE HCL 0.4 MG/ML
0.4 VIAL (ML) INJECTION AS NEEDED
Status: DISCONTINUED | OUTPATIENT
Start: 2025-06-26 | End: 2025-06-26

## 2025-06-26 RX ORDER — HYDROMORPHONE HYDROCHLORIDE 1 MG/ML
0.5 INJECTION, SOLUTION INTRAMUSCULAR; INTRAVENOUS; SUBCUTANEOUS
Status: DISCONTINUED | OUTPATIENT
Start: 2025-06-26 | End: 2025-06-26

## 2025-06-26 RX ORDER — NALOXONE HCL 0.4 MG/ML
0.4 VIAL (ML) INJECTION
Status: DISCONTINUED | OUTPATIENT
Start: 2025-06-26 | End: 2025-06-27 | Stop reason: HOSPADM

## 2025-06-26 RX ORDER — LABETALOL HYDROCHLORIDE 5 MG/ML
5 INJECTION, SOLUTION INTRAVENOUS
Status: DISCONTINUED | OUTPATIENT
Start: 2025-06-26 | End: 2025-06-26

## 2025-06-26 RX ORDER — BUPIVACAINE HYDROCHLORIDE 5 MG/ML
INJECTION, SOLUTION EPIDURAL; INTRACAUDAL; PERINEURAL
Status: COMPLETED | OUTPATIENT
Start: 2025-06-26 | End: 2025-06-26

## 2025-06-26 RX ORDER — PROPOFOL 10 MG/ML
VIAL (ML) INTRAVENOUS AS NEEDED
Status: DISCONTINUED | OUTPATIENT
Start: 2025-06-26 | End: 2025-06-26 | Stop reason: SURG

## 2025-06-26 RX ORDER — ONDANSETRON 2 MG/ML
INJECTION INTRAMUSCULAR; INTRAVENOUS AS NEEDED
Status: DISCONTINUED | OUTPATIENT
Start: 2025-06-26 | End: 2025-06-26 | Stop reason: SURG

## 2025-06-26 RX ORDER — FAMOTIDINE 10 MG/ML
20 INJECTION, SOLUTION INTRAVENOUS 2 TIMES DAILY
Status: DISCONTINUED | OUTPATIENT
Start: 2025-06-26 | End: 2025-06-27 | Stop reason: HOSPADM

## 2025-06-26 RX ORDER — SODIUM CHLORIDE, SODIUM LACTATE, POTASSIUM CHLORIDE, CALCIUM CHLORIDE 600; 310; 30; 20 MG/100ML; MG/100ML; MG/100ML; MG/100ML
9 INJECTION, SOLUTION INTRAVENOUS CONTINUOUS PRN
Status: DISCONTINUED | OUTPATIENT
Start: 2025-06-26 | End: 2025-06-26 | Stop reason: HOSPADM

## 2025-06-26 RX ORDER — HYDRALAZINE HYDROCHLORIDE 20 MG/ML
5 INJECTION INTRAMUSCULAR; INTRAVENOUS
Status: DISCONTINUED | OUTPATIENT
Start: 2025-06-26 | End: 2025-06-26

## 2025-06-26 RX ORDER — ONDANSETRON 2 MG/ML
4 INJECTION INTRAMUSCULAR; INTRAVENOUS EVERY 6 HOURS PRN
Status: DISCONTINUED | OUTPATIENT
Start: 2025-06-26 | End: 2025-06-27 | Stop reason: HOSPADM

## 2025-06-26 RX ORDER — ACETAMINOPHEN 325 MG/1
650 TABLET ORAL ONCE AS NEEDED
Status: DISCONTINUED | OUTPATIENT
Start: 2025-06-26 | End: 2025-06-26

## 2025-06-26 RX ORDER — FENTANYL CITRATE 50 UG/ML
25 INJECTION, SOLUTION INTRAMUSCULAR; INTRAVENOUS
Status: DISCONTINUED | OUTPATIENT
Start: 2025-06-26 | End: 2025-06-26

## 2025-06-26 RX ADMIN — FAMOTIDINE 20 MG: 10 INJECTION INTRAVENOUS at 13:28

## 2025-06-26 RX ADMIN — PROPOFOL 140 MG: 10 INJECTION, EMULSION INTRAVENOUS at 07:40

## 2025-06-26 RX ADMIN — FENTANYL CITRATE 50 MCG: 50 INJECTION, SOLUTION INTRAMUSCULAR; INTRAVENOUS at 07:52

## 2025-06-26 RX ADMIN — CEFAZOLIN 2000 MG: 2 INJECTION, POWDER, FOR SOLUTION INTRAMUSCULAR; INTRAVENOUS at 07:29

## 2025-06-26 RX ADMIN — FAMOTIDINE 20 MG: 10 INJECTION INTRAVENOUS at 20:01

## 2025-06-26 RX ADMIN — EPHEDRINE SULFATE 10 MG: 5 INJECTION INTRAVENOUS at 08:20

## 2025-06-26 RX ADMIN — ONDANSETRON 4 MG: 2 INJECTION, SOLUTION INTRAMUSCULAR; INTRAVENOUS at 08:57

## 2025-06-26 RX ADMIN — FENTANYL CITRATE 25 MCG: 50 INJECTION, SOLUTION INTRAMUSCULAR; INTRAVENOUS at 08:26

## 2025-06-26 RX ADMIN — SODIUM CHLORIDE, SODIUM LACTATE, POTASSIUM CHLORIDE, AND CALCIUM CHLORIDE: .6; .31; .03; .02 INJECTION, SOLUTION INTRAVENOUS at 07:33

## 2025-06-26 RX ADMIN — DEXAMETHASONE SODIUM PHOSPHATE 8 MG: 4 INJECTION, SOLUTION INTRA-ARTICULAR; INTRALESIONAL; INTRAMUSCULAR; INTRAVENOUS; SOFT TISSUE at 07:44

## 2025-06-26 RX ADMIN — GLYCOPYRROLATE 0.1 MG: 0.2 INJECTION INTRAMUSCULAR; INTRAVENOUS at 08:18

## 2025-06-26 RX ADMIN — Medication 1000 UNITS: at 16:32

## 2025-06-26 RX ADMIN — NEBIVOLOL 10 MG: 10 TABLET ORAL at 16:32

## 2025-06-26 RX ADMIN — BUPIVACAINE HYDROCHLORIDE 10 ML: 5 INJECTION, SOLUTION EPIDURAL; INTRACAUDAL; PERINEURAL at 07:20

## 2025-06-26 RX ADMIN — CEFAZOLIN 2000 MG: 2 INJECTION, POWDER, FOR SOLUTION INTRAMUSCULAR; INTRAVENOUS at 16:31

## 2025-06-26 RX ADMIN — TILMANOCEPT 1 DOSE: KIT at 07:49

## 2025-06-26 RX ADMIN — FENTANYL CITRATE 25 MCG: 50 INJECTION, SOLUTION INTRAMUSCULAR; INTRAVENOUS at 07:40

## 2025-06-26 RX ADMIN — ASPIRIN 81 MG CHEWABLE TABLET 81 MG: 81 TABLET CHEWABLE at 13:27

## 2025-06-26 RX ADMIN — MIDAZOLAM 2 MG: 1 INJECTION INTRAMUSCULAR; INTRAVENOUS at 07:16

## 2025-06-26 RX ADMIN — THERA TABS 1 TABLET: TAB at 13:28

## 2025-06-26 RX ADMIN — BUPIVACAINE 10 ML: 13.3 INJECTION, SUSPENSION, LIPOSOMAL INFILTRATION at 07:20

## 2025-06-26 RX ADMIN — LIDOCAINE HYDROCHLORIDE 80 MG: 20 INJECTION, SOLUTION EPIDURAL; INFILTRATION; INTRACAUDAL; PERINEURAL at 07:40

## 2025-06-26 RX ADMIN — CEFAZOLIN 2000 MG: 2 INJECTION, POWDER, FOR SOLUTION INTRAMUSCULAR; INTRAVENOUS at 22:37

## 2025-06-26 RX ADMIN — SPIRONOLACTONE 25 MG: 25 TABLET ORAL at 13:28

## 2025-06-26 NOTE — PLAN OF CARE
Goal Outcome Evaluation:  Plan of Care Reviewed With: patient        Progress: improving        Patient ambulated in hallway, ALVA drain in place, makes needs known

## 2025-06-26 NOTE — OP NOTE
MASTECTOMY SIMPLE WITH SENTINEL NODE BIOPSY  Operative Note    Patient Name:  Shasha Freeman  YOB: 1945    Date of Surgery:  6/26/2025     Indications: Patient is a 80-year-old lady with a recurrent left breast cancer.  We discussed the risk, benefits, and alternatives to surgery, the patient stands, and agrees to proceed the operating room for left mastectomy and sentinel lymph node biopsy.    Pre-op Diagnosis:   Malignant neoplasm of lower-inner quadrant of left breast in female, estrogen receptor positive [C50.312, Z17.0]    Post-op Diagnosis:  Post-Op Diagnosis Codes:     * Malignant neoplasm of lower-inner quadrant of left breast in female, estrogen receptor positive [C50.312, Z17.0]    Procedure/CPT® Codes:    Procedure(s):  MASTECTOMY SIMPLE WITH SENTINEL NODE BIOPSY    Staff:  Surgeon(s):  Jaime De Anda MD    Assistant: Allie Caceres CSA was responsible for performing the following activities: Retraction, Suction, Closing, and Placing Dressing and their skilled assistance was necessary for the success of this case.     Anesthesia: General with Block    Estimated Blood Loss: 25 mL    Implants:    Nothing was implanted during the procedure    Specimen:          Specimens       ID Source Type Tests Collected By Collected At Frozen?    A Breast, Left Tissue TISSUE PATHOLOGY EXAM   Jaime De Anda MD 6/26/25 7052     Description: with sentineal node. long stitch lateral, short stitch superior    This specimen was not marked as sent.    B [place] Lymph Node Tissue TISSUE PATHOLOGY EXAM   Jaime De Anda MD 6/26/25 3417 No    This specimen was not marked as sent.            Findings: Left breast removed in its entirety.  [place] lymph node removed from the left axilla containing both blue dye and radioactive dye measuring 240 on neoprobe ex vivo  [place] Node Biopsy for Breast Cancer - Left  Operation performed with curative intent. Yes   Tracer(s) used to  identify sentinel nodes in the upfront surgery (non-neoadjuvant) setting (select all that apply). Dye and Radioactive tracer   Tracer(s) used to identify sentinel nodes in the neoadjuvant setting (select all that apply). N/A   All nodes (colored or non-colored) present at the end of a dye-filled lymphatic channel were removed. Yes   All significantly radioactive nodes were removed. Yes   All palpably suspicious nodes were removed. Yes   Biopsy-proven positive nodes marked with clips prior to chemotherapy were identified and removed. N/A      Complications: none, immediately    Description of Procedure: After obtaining informed consent in the preop holding area, the patient was brought the operating room placed in the supine position.  SCDs were applied, preoperative antibiotics were administered.  The patient then underwent uncomplicated duction of general endotracheal anesthesia.  I then injected radioactive dye and methylene blue in the periareolar tissues of the left nipple areolar complex.  The left breast was then prepped and draped in the usual sterile fashion, and after brief timeout the procedure began.  I began by marking an elliptical incision around the left nipple areola complex and then used a 15 blade to incise the skin along the markings.  The dermis was divided using electrocautery and then I raised a skin and subcutaneous fat flap from the incision to the level of the clavicle superiorly.  The medial and lateral borders of dissection were the lateral border of the sternum and the lateral border the pectoralis major muscle respectively.  I then turned my attention towards creation of an inferior skin and subcutaneous fat flap in a similar fashion went from the incision to the level of the IMF.  Again the medial and lateral borders of dissection were the same.  I then returned to the medial aspect of the incision and raised the breast tissue off of the anterior fascia of the pectoralis major muscle in  a medial to lateral fashion.  Care was taken to ensure that the entire breast tissue was removed including the axillary tail and this was accomplished by ensuring that the latissimus dorsi muscle could be seen in the deep lateral aspect of my dissection.  While dissecting laterally I did note that there was blue dye going into the lymph node which appeared to be quite generous in size.  Using electrocautery I was able to remove this lymph node which was partially filled with blue dye which was quite large.  Additionally there was a second lymph node containing blue dye just inferior to this which also was removed using electrocautery to divide the small feeding lymphatics and blood vessels away from the lymph node.  With the breast and lymph nodes not completely removed the breast was marked with a short stitch marking the superior margin a long stitch marking the lateral margin.  The lymph nodes were examined using the neoprobe and measured 240 on neoprobe ex vivo.  I then returned to the axilla and there was no evidence of any uptake within 10% of 240 or 24.  Additionally, there were no further blue lymph nodes noted.  A 19 [place] ALVA drain was placed underneath the skin flaps superiorly and inferiorly.  This was secured at the level of skin using 3-0 nylon suture.  I then irrigated the wound with copious amounts of warm sterile water and ensured hemostasis using electrocautery.  We then closed the incision using interrupted 3-0 Vicryl to reapproximate the dermis and running 4-0 Monocryl in a subicular fashion to reapproximate the skin.  The wound was dressed with skin glue, fluffs, ABD, and a bra.  The patient tolerated the procedure well, was awoken, and taken to PACU in satisfactory condition.    Jaime De Anda MD     Date: 6/26/2025  Time: 09:25 EDT

## 2025-06-26 NOTE — ANESTHESIA PROCEDURE NOTES
Peripheral Block    Pre-sedation assessment completed: 6/26/2025 7:14 AM    Patient reassessed immediately prior to procedure    Patient location during procedure: pre-op  Reason for block: procedure for pain, at surgeon's request, post-op pain management and secondary anesthetic  Performed by  Anesthesiologist: Maninder Garcia MD  Assisted by: Nicole Ramirez RN  Preanesthetic Checklist  Completed: patient identified, IV checked, site marked, risks and benefits discussed, surgical consent, monitors and equipment checked, pre-op evaluation and timeout performed  Prep:  Pt Position: sitting  Sterile barriers:cap, gloves, mask and washed/disinfected hands  Prep: ChloraPrep  Patient monitoring: blood pressure monitoring, continuous pulse oximetry and EKG  Procedure    Sedation: yes  Performed under: local infiltration  Guidance:ultrasound guided and landmark technique    ULTRASOUND INTERPRETATION.  Using ultrasound guidance a 20 G gauge needle was placed in close proximity to the nerve, at which point, under ultrasound guidance anesthetic was injected in the area of the nerve and spread of the anesthesia was seen on ultrasound in close proximity thereto.  There were no abnormalities seen on ultrasound; a digital image was taken; and the patient tolerated the procedure with no complications. Images:still images obtained, printed/placed on chart    Laterality:left  Block Type:erector spinae block  Injection Technique:single-shot  Needle Type:echogenic  Needle Gauge:20 G  Resistance on Injection: less than 15 psi    Medications Used: bupivacaine liposome (EXPAREL) injection 1.3% - Perineural   10 mL - 6/26/2025 7:20:00 AM  bupivacaine PF (MARCAINE) injection 0.5% - Perineural   10 mL - 6/26/2025 7:20:00 AM      Medications  Preservative Free Saline:10ml    Post Assessment  Injection Assessment: negative aspiration for heme, no paresthesia on injection and incremental injection  Patient Tolerance:comfortable  throughout block  Complications:no  Additional Notes  Pre-procedure:  LISHA block performed preoperatively prior to the start of anesthesia time at the request of the patient and the surgeon for the management of postoperative acute surgical pain as well as for a secondary intraoperative anesthetic (general anesthesia is the primary intraoperative anesthetic); patient identified; pre-procedure vital signs, all relevant labs/studies, complete medical/surgical/anesthetic history, full medication list, full allergy list, and NPO status obtained/reviewed; physical assessment performed; anesthetic options, side effects, potential complications, risks, and benefits discussed; questions answered; patient wishes to proceed with the procedure; written anesthesia procedure consent obtained; patient cleared for procedure; time out performed; IV access in situ    Procedure:  ASA monitor placed; supplemental oxygen provided; patient positioned; hand hygiene performed; sterile technique maintained throughout the procedure; sterile prep applied; insertion site determined by anatomical landmarks, palpation, and ultrasound imaging; live ultrasound guidance throughout the procedure; target nerves/landmarks identified on live ultrasound; skin and subcutaneous tissues numbed by injection of 1% lidocaine; 4 inch 20G Turned On Digital Ultra 360 Insulated Echogenic Needle used; realtime needle advancement and placement in the target anatomic plane witnessed on live ultrasound; negative aspiration prior to injection; correct needle placement confirmed on live ultrasound by local anesthetic spread in the correct anatomic plane; local anesthetic mixture injected with negative aspiration prior to each injection and after each 1-5 mL injected; needle withdrawn; dressing applied; ultrasound image printed and placed in the patient's permanent medical record    Post-procedure:  LISHA block placed successfully; good block; no apparent complications; minimal  estimated blood loss; vital signs stable throughout; see nurse's notes for vitals; transported to the OR, general anesthesia induced, and surgery started  Performed by: Maninder Garcia MD

## 2025-06-26 NOTE — ANESTHESIA PROCEDURE NOTES
Airway  Date/Time: 6/26/2025 7:41 AM    General Information and Staff    Patient location during procedure: OR  CRNA/CAA: Dara Lang CRNA    Indications and Patient Condition  Indications for airway management: airway protection    Preoxygenated: yes  MILS maintained throughout    Mask difficulty assessment: 0 - not attempted    Final Airway Details    Final airway type: supraglottic airway      Successful airway: I-gel  Size: 4   Number of attempts at approach: 1  Assessment: lips, teeth, and gum same as pre-op

## 2025-06-26 NOTE — ANESTHESIA POSTPROCEDURE EVALUATION
Patient: Shasha Freeman    Procedure Summary       Date: 06/26/25 Room / Location: [facility] 06 / [facility] OR    Anesthesia Start: 0733 Anesthesia Stop: 0939    Procedure: MASTECTOMY SIMPLE WITH SENTINEL NODE BIOPSY (Left: Breast) Diagnosis:       Malignant neoplasm of lower-inner quadrant of left breast in female, estrogen receptor positive      (Malignant neoplasm of lower-inner quadrant of left breast in female, estrogen receptor positive [C50.312, Z17.0])    Surgeons: Jaime De Anda MD Provider: Maninder Garcia MD    Anesthesia Type: general with block ASA Status: 3            Anesthesia Type: general with block    Vitals  Vitals Value Taken Time   /63 06/26/25 10:55   Temp 97.6 °F (36.4 °C) 06/26/25 09:40   Pulse 72 06/26/25 11:02   Resp 11 06/26/25 10:55   SpO2 91 % 06/26/25 11:02   Vitals shown include unfiled device data.        Post Anesthesia Care and Evaluation    Patient location during evaluation: PACU  Patient participation: complete - patient participated  Level of consciousness: awake  Pain scale: See nurse's notes for pain score.  Pain management: adequate    Airway patency: patent  Anesthetic complications: No anesthetic complications  PONV Status: none  Cardiovascular status: acceptable  Respiratory status: acceptable and spontaneous ventilation  Hydration status: acceptable    Comments: Patient seen and examined postoperatively; vital signs stable; SpO2 greater than or equal to 90%; cardiopulmonary status stable; nausea/vomiting adequately controlled; pain adequately controlled; no apparent anesthesia complications; patient discharged from anesthesia care when discharge criteria were met

## 2025-06-27 ENCOUNTER — TELEPHONE (OUTPATIENT)
Age: 80
End: 2025-06-27
Payer: MEDICARE

## 2025-06-27 ENCOUNTER — TELEPHONE (OUTPATIENT)
Dept: ONCOLOGY | Facility: CLINIC | Age: 80
End: 2025-06-27
Payer: MEDICARE

## 2025-06-27 VITALS
TEMPERATURE: 98.2 F | WEIGHT: 189.4 LBS | RESPIRATION RATE: 14 BRPM | DIASTOLIC BLOOD PRESSURE: 86 MMHG | OXYGEN SATURATION: 94 % | SYSTOLIC BLOOD PRESSURE: 174 MMHG | HEART RATE: 66 BPM | BODY MASS INDEX: 29.73 KG/M2 | HEIGHT: 67 IN

## 2025-06-27 DIAGNOSIS — Z17.0 MALIGNANT NEOPLASM OF UPPER-INNER QUADRANT OF LEFT BREAST IN FEMALE, ESTROGEN RECEPTOR POSITIVE: ICD-10-CM

## 2025-06-27 DIAGNOSIS — Z17.0 MALIGNANT NEOPLASM OF LOWER-INNER QUADRANT OF LEFT BREAST IN FEMALE, ESTROGEN RECEPTOR POSITIVE: Primary | ICD-10-CM

## 2025-06-27 DIAGNOSIS — C50.312 MALIGNANT NEOPLASM OF LOWER-INNER QUADRANT OF LEFT BREAST IN FEMALE, ESTROGEN RECEPTOR POSITIVE: Primary | ICD-10-CM

## 2025-06-27 DIAGNOSIS — C50.212 MALIGNANT NEOPLASM OF UPPER-INNER QUADRANT OF LEFT BREAST IN FEMALE, ESTROGEN RECEPTOR POSITIVE: ICD-10-CM

## 2025-06-27 LAB
LAB AP CASE REPORT: NORMAL
LAB AP SPECIAL STAINS: NORMAL
LAB AP SYNOPTIC CHECKLIST: NORMAL
PATH REPORT.FINAL DX SPEC: NORMAL
PATH REPORT.GROSS SPEC: NORMAL

## 2025-06-27 PROCEDURE — A9270 NON-COVERED ITEM OR SERVICE: HCPCS | Performed by: SURGERY

## 2025-06-27 PROCEDURE — 63710000001 MULTIVITAMIN TABLET: Performed by: SURGERY

## 2025-06-27 PROCEDURE — 63710000001 SPIRONOLACTONE 25 MG TABLET: Performed by: SURGERY

## 2025-06-27 PROCEDURE — 63710000001 ASPIRIN 81 MG CHEWABLE TABLET: Performed by: SURGERY

## 2025-06-27 PROCEDURE — 25010000002 FAMOTIDINE 10 MG/ML SOLUTION: Performed by: SURGERY

## 2025-06-27 RX ORDER — HYDROCODONE BITARTRATE AND ACETAMINOPHEN 5; 325 MG/1; MG/1
1 TABLET ORAL EVERY 4 HOURS PRN
Qty: 15 TABLET | Refills: 0 | Status: SHIPPED | OUTPATIENT
Start: 2025-06-27

## 2025-06-27 RX ORDER — OXYCODONE HYDROCHLORIDE 5 MG/1
5 TABLET ORAL EVERY 6 HOURS PRN
Qty: 15 TABLET | Refills: 0 | Status: SHIPPED | OUTPATIENT
Start: 2025-06-27

## 2025-06-27 RX ADMIN — FAMOTIDINE 20 MG: 10 INJECTION INTRAVENOUS at 09:04

## 2025-06-27 RX ADMIN — ASPIRIN 81 MG CHEWABLE TABLET 81 MG: 81 TABLET CHEWABLE at 09:04

## 2025-06-27 RX ADMIN — SPIRONOLACTONE 25 MG: 25 TABLET ORAL at 09:04

## 2025-06-27 RX ADMIN — THERA TABS 1 TABLET: TAB at 09:04

## 2025-06-27 NOTE — TELEPHONE ENCOUNTER
Caller: Shasha Freeman    Relationship: Self    Best call back number:   Telephone Information:   Mobile [phone]       What is the best time to reach you: ANYTIME     What was the call regarding: PT WAS CALLING BACK FROM MISSED CALL ON 6/25 FROM SANJEEV TRYING TO R/S HER F/U. TRIED TO WT PT, HOWEVER GOT DISCON AND WAS NOT ABLE TO GET HER BACK ON THE LINE. PLEASE CB TO R/S .

## 2025-06-27 NOTE — DISCHARGE SUMMARY
Date of Discharge:  6/27/2025    Discharge Diagnosis: Status post left mastectomy and sentinel lymph node biopsy    Presenting Problem/History of Present Illness  Active Hospital Problems    Diagnosis  POA    **Malignant neoplasm of female breast [C50.919]  Yes    Breast cancer [C50.919]  Yes      Resolved Hospital Problems   No resolved problems to display.        Hospital Course  Patient admitted 6/26/2025 following a left mastectomy with sentinel lymph node biopsy with Dr. De Anda earlier that same day.  Hospital course has been uncomplicated.  She is postoperative day 1 and doing well.  Her pain is controlled and is tolerating a regular diet.  Appears to be doing well and suitable for discharge.  Discussed discharge instructions with patient and daughter.  Follow-up in 2 weeks with Dr. De Anda in office.    Procedures Performed    Procedure(s):  MASTECTOMY SIMPLE WITH SENTINEL NODE BIOPSY  -------------------        Consults:   Consults       No orders found for last 30 day(s).            Condition on Discharge:  Satisfactory    Vital Signs  Temp:  [97.6 °F (36.4 °C)-98.6 °F (37 °C)] 98.2 °F (36.8 °C)  Heart Rate:  [66-82] 66  Resp:  [10-19] 14  BP: (131-174)/(59-86) 174/86    Physical Exam:  General: Alert and oriented, cooperative.  No acute distress  Cardiopulmonary: Regular rate and rhythm, normal pulmonary effort.  No respiratory distress  Chest: Left breast is absent.  Incision clean dry and intact with skin glue.  No surrounding erythema, induration, or fluctuation noted.  Appropriate tenderness to palpation.  ALVA x 1 with sanguinous output noted.     Discharge Disposition  Home or Self Care    Discharge Medications     Discharge Medications        New Medications        Instructions Start Date   HYDROcodone-acetaminophen 5-325 MG per tablet  Commonly known as: NORCO   1 tablet, Oral, Every 4 Hours PRN             Continue These Medications        Instructions Start Date   aspirin 81 MG chewable  tablet   81 mg, Daily      Bystolic 10 MG tablet  Generic drug: nebivolol   10 mg, Every Evening      cholecalciferol 25 MCG (1000 UT) tablet  Commonly known as: VITAMIN D3   1,000 Units, Every Evening      ICAPS AREDS 2 PO   1 tablet, 2 Times Daily      multivitamin tablet tablet   1 tablet, Daily      spironolactone 25 MG tablet  Commonly known as: ALDACTONE   25 mg, Daily PRN      sulfamethoxazole-trimethoprim 800-160 MG per tablet  Commonly known as: BACTRIM DS,SEPTRA DS   1 tablet, 2 Times Daily      THERA TEARS NUTRITION PO   Thera Tears Nutrition               Discharge Diet:   Diet Instructions       Diet: Regular/House Diet; Regular (IDDSI 7); Thin (IDDSI 0)      Discharge Diet: Regular/House Diet    Texture: Regular (IDDSI 7)    Fluid Consistency: Thin (IDDSI 0)            Activity at Discharge:   Activity Instructions       Activity as Tolerated              Follow-up Appointments  No future appointments.  Additional Instructions for the Follow-ups that You Need to Schedule       Discharge Follow-up with Specified Provider: Dr. De Anda; 2 Weeks   As directed      To: Dr. De Anda   Follow Up: 2 Weeks        Notify Physician or Go To The ED For the Following Conditions   As directed      Call my office or present to the ED with: fevers greater than 101.5, redness around the incisions, drainage from the incisions, intractable nausea/vomiting, or pain that is getting worse instead of better    Order Comments: Call my office or present to the ED with: fevers greater than 101.5, redness around the incisions, drainage from the incisions, intractable nausea/vomiting, or pain that is getting worse instead of better                 Test Results Pending at Discharge  Pending Results       None             DEWEY Lee  06/27/25  09:25 EDT

## 2025-06-27 NOTE — DISCHARGE INSTRUCTIONS
Ok for discharge  Follow-up with me (Dr. De Anda) in 2 weeks  Regular diet as tolerated  Ok to shower starting tomorrow. No tub baths, pools, lakes, or streams for 1 week.  Okay to remove brought to shower or if bras physically soiled to wash, otherwise wear a bra at all times  Ok to apply ice to incisions  No physical restrictions, may return to work once drain is removed  Please empty ALVA drain, record output, and bring this with you to your follow-up appointment  Call my office or present to the ED with: fevers greater than 101.5, redness around the incisions, drainage from the incisions, intractable nausea/vomiting, or pain that is getting worse instead of better

## 2025-06-27 NOTE — CASE MANAGEMENT/SOCIAL WORK
Discharge Planning Assessment   [place]     Patient Name: Shasha Freeman  MRN: [id number]  Today's Date: 6/27/2025    Admit Date: 6/26/2025    Plan: DC PLAN: Routine home       Discharge Needs Assessment       Row Name 06/27/25 0926       Living Environment    People in Home spouse    Current Living Arrangements home    Potentially Unsafe Housing Conditions none    In the past 12 months has the electric, gas, oil, or water company threatened to shut off services in your home? No    Primary Care Provided by self    Provides Primary Care For no one    Family Caregiver if Needed spouse    Quality of Family Relationships helpful;involved;supportive    Able to Return to Prior Arrangements yes       Resource/Environmental Concerns    Resource/Environmental Concerns none    Transportation Concerns none       Transportation Needs    In the past 12 months, has lack of transportation kept you from medical appointments or from getting medications? no    In the past 12 months, has lack of transportation kept you from meetings, work, or from getting things needed for daily living? No       Food Insecurity    Within the past 12 months, you worried that your food would run out before you got the money to buy more. Never true    Within the past 12 months, the food you bought just didn't last and you didn't have money to get more. Never true       Transition Planning    Patient/Family Anticipates Transition to home with family    Patient/Family Anticipated Services at Transition none    Transportation Anticipated car, drives self;family or friend will provide       Discharge Needs Assessment    Readmission Within the Last 30 Days no previous admission in last 30 days    Equipment Currently Used at Home none    Anticipated Changes Related to Illness none    Equipment Needed After Discharge none                   Discharge Plan       Row Name 06/27/25 0927       Plan    Plan DC PLAN: Routine home      Patient/Family in Agreement with  Plan yes    Plan Comments CM Met with patient at bedside, from routine home with . Independent with ADL's no DME. Although has walker, cane, bedside commode in basement, does not use. PCP is [facility], Pharmacy is [facility] in [place]. Does not agree to Meds to Beds at this time. Able to afford medications, denies issues with food or utilities. Denies any transportation issues, Denies any HHC or SNF. Denies any concerns about return home.                  Continued Care and Services - Admitted Since 6/26/2025    No active coordination exists.       Expected Discharge Date and Time       Expected Discharge Date Expected Discharge Time    Jun 27, 2025            Demographic Summary       Row Name 06/27/25 0926       General Information    Admission Type observation    Arrived From emergency department    Required Notices Provided Observation Status Notice    Referral Source admission list    Reason for Consult discharge planning    Preferred Language English                   Functional Status       Row Name 06/27/25 0926       Functional Status    Usual Activity Tolerance excellent    Current Activity Tolerance excellent       Physical Activity    On average, how many days per week do you engage in moderate to strenuous exercise (like a brisk walk)? 0 days    On average, how many minutes do you engage in exercise at this level? 0 min    Number of minutes of exercise per week 0       Assessment of Health Literacy    How often do you have someone help you read hospital materials? Sometimes    How often do you have problems learning about your medical condition because of difficulty understanding written information? Sometimes    How often do you have a problem understanding what is told to you about your medical condition? Sometimes    How confident are you filling out medical forms by yourself? Somewhat    Health Literacy Moderate       Functional Status, IADL    Medications independent    Meal Preparation  independent    Housekeeping independent    Laundry independent    Shopping independent       Mental Status    General Appearance WDL WDL       Mental Status Summary    Recent Changes in Mental Status/Cognitive Functioning no changes                Met with patient in room wearing PPE:     Maintained distance greater than six feet and spent less than 15 minutes in the room.    Madison Winters RN    Case Management  [phone]

## 2025-06-27 NOTE — PLAN OF CARE
Problem: Adult Inpatient Plan of Care  Goal: Absence of Hospital-Acquired Illness or Injury  Outcome: Progressing  Intervention: Identify and Manage Fall Risk  Recent Flowsheet Documentation  Taken 6/27/2025 0800 by Karis Delgado RN  Safety Promotion/Fall Prevention: safety round/check completed   Goal Outcome Evaluation:  Plan of Care Reviewed With: patient        Progress: improving

## 2025-06-27 NOTE — TELEPHONE ENCOUNTER
LVM FOR IFEANYI THAT SHE HAS ALREADY R/S HER FOLLOW UP AND THAT WAS AN OLD MESSAGE.  IF SHE DOES NOT NEED ANYTHING ELSE I TOLD HER SHE DIDN'T NEED TO RETURN MY CALL.

## 2025-06-27 NOTE — PLAN OF CARE
Goal Outcome Evaluation:         Patient is alert and oriented. Patient is able to make needs known. Patient is resting this patricia with no complaints. Call light within reach. Care plan ongoing.

## 2025-06-30 ENCOUNTER — TELEPHONE (OUTPATIENT)
Dept: SURGERY | Facility: CLINIC | Age: 80
End: 2025-06-30
Payer: MEDICARE

## 2025-06-30 NOTE — CASE MANAGEMENT/SOCIAL WORK
Case Management Discharge Note      Final Note: Routine home         Selected Continued Care - Discharged on 6/27/2025 Admission date: 6/26/2025 - Discharge disposition: Home or Self Care              Transportation Services  Transportation: Private Transportation  Private: Car    Final Discharge Disposition Code: 01 - home or self-care

## 2025-06-30 NOTE — TELEPHONE ENCOUNTER
PO FU Call- spoke with pt. Already has 2 wk po appt. Will fu then. Knows to call with any questions or concerns.      States doing well

## 2025-06-30 NOTE — TELEPHONE ENCOUNTER
Provider: DEAN    Caller: Shasha Freeman    Relationship to Patient: Self    Phone Number: [phone]    Reason for Call: PATIENT STATES NEEDS CLARIFICATION ON POST OP INSTRUCTIONS. HAD MASTECTOMY SIMPLE WITH SENTINEL NODE BIOPSY ON 06/26

## 2025-07-08 ENCOUNTER — OFFICE VISIT (OUTPATIENT)
Dept: SURGERY | Facility: CLINIC | Age: 80
End: 2025-07-08
Payer: MEDICARE

## 2025-07-08 VITALS
BODY MASS INDEX: 30.01 KG/M2 | DIASTOLIC BLOOD PRESSURE: 94 MMHG | SYSTOLIC BLOOD PRESSURE: 190 MMHG | OXYGEN SATURATION: 96 % | HEIGHT: 67 IN | TEMPERATURE: 97.7 F | HEART RATE: 67 BPM | RESPIRATION RATE: 16 BRPM | WEIGHT: 191.2 LBS

## 2025-07-08 DIAGNOSIS — Z17.0 MALIGNANT NEOPLASM OF LOWER-INNER QUADRANT OF LEFT BREAST IN FEMALE, ESTROGEN RECEPTOR POSITIVE: Primary | ICD-10-CM

## 2025-07-08 DIAGNOSIS — C50.312 MALIGNANT NEOPLASM OF LOWER-INNER QUADRANT OF LEFT BREAST IN FEMALE, ESTROGEN RECEPTOR POSITIVE: Primary | ICD-10-CM

## 2025-07-08 PROCEDURE — 1160F RVW MEDS BY RX/DR IN RCRD: CPT | Performed by: SURGERY

## 2025-07-08 PROCEDURE — 3080F DIAST BP >= 90 MM HG: CPT | Performed by: SURGERY

## 2025-07-08 PROCEDURE — 3077F SYST BP >= 140 MM HG: CPT | Performed by: SURGERY

## 2025-07-08 PROCEDURE — 99024 POSTOP FOLLOW-UP VISIT: CPT | Performed by: SURGERY

## 2025-07-08 PROCEDURE — 1159F MED LIST DOCD IN RCRD: CPT | Performed by: SURGERY

## 2025-07-11 NOTE — PROGRESS NOTES
"Post-op Note    Subjective   Shasha Freeman is a 80 y.o. female status post left mastectomy and sentinel lymph node biopsy performed on 6/27/2025 for recurrent left breast cancer.  Overall, the patient is doing okay.  She still is having a significant amount of drainage out of her ALVA drain which is greater than 50 cc/day, and therefore we will be unable to remove it.  She has reported some redness around her drain site.  But overall is doing fairly well      Objective   BP (!) 190/94 (BP Location: Left arm, Patient Position: Sitting, Cuff Size: Large Adult)   Pulse 67   Temp 97.7 °F (36.5 °C) (Infrared)   Resp 16   Ht 170.2 cm (67\")   Wt 86.7 kg (191 lb 3.2 oz)   SpO2 96%   BMI 29.95 kg/m²   Surgical incision is well-healed without any surrounding erythema, duration, or drainage to suggest infection.  ALVA drain with thin purple serous fluid in bulb.      Assessment & Plan   Patient is a 80-year-old lady status post left mastectomy and sentinel lymph node biopsy performed on 6/27/2025 for recurrent left breast cancer.    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  See synoptic template for additional details    Discussed with patient that her drain output is too high to consider removal at this time.  Recommend returning once her ALVA drain output has been less than 30 cc/day for at least 2 days.  Okay for activities as tolerated.  Will need a prosthetic bra once drain is removed. This patient has had a mastectomy, and has medical need of a new breast prosthesis and a brassiere for balance and symmetry.   Discussed again with patient that she will benefit from endocrine therapy.  The patient has been adamant that she does not want to start endocrine therapy.  Will defer to medical oncology  Will not need radiation oncology as she has " undergone a mastectomy  Once her drain is out she will follow-up with me every 6 months for 2 years.    Jaime De Anda MD  7/11/2025  10:48 EDT    Answers submitted by the patient for this visit:  Post Operative Visit (Submitted on 7/8/2025)  Chief Complaint: Follow-up  Pain Control: not requiring pain medication  Fever: no fever  Diet: adequate intake  Activity: returning to normal  Operative Site Issues: No

## 2025-07-15 ENCOUNTER — CLINICAL SUPPORT (OUTPATIENT)
Age: 80
End: 2025-07-15
Payer: MEDICARE

## 2025-07-15 DIAGNOSIS — C50.312 MALIGNANT NEOPLASM OF LOWER-INNER QUADRANT OF LEFT BREAST IN FEMALE, ESTROGEN RECEPTOR POSITIVE: Primary | ICD-10-CM

## 2025-07-15 DIAGNOSIS — Z17.0 MALIGNANT NEOPLASM OF LOWER-INNER QUADRANT OF LEFT BREAST IN FEMALE, ESTROGEN RECEPTOR POSITIVE: Primary | ICD-10-CM

## 2025-07-15 NOTE — PROGRESS NOTES
Pt came in for ALVA Drain removal. Draining less than 30cc last 3 days. 15cc in bulb was last emptied over 12 hrs beforehand. Pt tolerated drain removal no complaints.

## 2025-08-11 ENCOUNTER — OFFICE VISIT (OUTPATIENT)
Dept: ONCOLOGY | Facility: CLINIC | Age: 80
End: 2025-08-11
Payer: MEDICARE

## 2025-08-11 ENCOUNTER — LAB (OUTPATIENT)
Dept: LAB | Facility: HOSPITAL | Age: 80
End: 2025-08-11
Payer: MEDICARE

## 2025-08-11 VITALS
HEIGHT: 67 IN | OXYGEN SATURATION: 94 % | RESPIRATION RATE: 14 BRPM | HEART RATE: 63 BPM | DIASTOLIC BLOOD PRESSURE: 95 MMHG | SYSTOLIC BLOOD PRESSURE: 182 MMHG | TEMPERATURE: 96.8 F | BODY MASS INDEX: 29.7 KG/M2 | WEIGHT: 189.2 LBS

## 2025-08-11 DIAGNOSIS — R92.8 ABNORMAL SCREENING MAMMOGRAM: ICD-10-CM

## 2025-08-11 DIAGNOSIS — Z17.0 MALIGNANT NEOPLASM OF LOWER-INNER QUADRANT OF LEFT BREAST IN FEMALE, ESTROGEN RECEPTOR POSITIVE: ICD-10-CM

## 2025-08-11 DIAGNOSIS — C50.312 MALIGNANT NEOPLASM OF LOWER-INNER QUADRANT OF LEFT BREAST IN FEMALE, ESTROGEN RECEPTOR POSITIVE: Primary | ICD-10-CM

## 2025-08-11 DIAGNOSIS — Z85.3 PERSONAL HISTORY OF MALIGNANT NEOPLASM OF BREAST: ICD-10-CM

## 2025-08-11 DIAGNOSIS — Z17.0 MALIGNANT NEOPLASM OF LOWER-INNER QUADRANT OF LEFT BREAST IN FEMALE, ESTROGEN RECEPTOR POSITIVE: Primary | ICD-10-CM

## 2025-08-11 DIAGNOSIS — Z12.31 ENCOUNTER FOR SCREENING MAMMOGRAM FOR MALIGNANT NEOPLASM OF BREAST: ICD-10-CM

## 2025-08-11 DIAGNOSIS — Z90.13 H/O BILATERAL MASTECTOMY: Primary | ICD-10-CM

## 2025-08-11 DIAGNOSIS — C50.312 MALIGNANT NEOPLASM OF LOWER-INNER QUADRANT OF LEFT BREAST IN FEMALE, ESTROGEN RECEPTOR POSITIVE: ICD-10-CM

## 2025-08-11 LAB
ALBUMIN SERPL-MCNC: 4.1 G/DL (ref 3.5–5.2)
ALBUMIN/GLOB SERPL: 1.4 G/DL
ALP SERPL-CCNC: 128 U/L (ref 39–117)
ALT SERPL W P-5'-P-CCNC: 27 U/L (ref 1–33)
ANION GAP SERPL CALCULATED.3IONS-SCNC: 10.5 MMOL/L (ref 5–15)
AST SERPL-CCNC: 28 U/L (ref 1–32)
BASOPHILS # BLD AUTO: 0.05 10*3/MM3 (ref 0–0.2)
BASOPHILS NFR BLD AUTO: 0.8 % (ref 0–1.5)
BILIRUB SERPL-MCNC: 0.4 MG/DL (ref 0–1.2)
BUN SERPL-MCNC: 20.8 MG/DL (ref 8–23)
BUN/CREAT SERPL: 22.9 (ref 7–25)
CALCIUM SPEC-SCNC: 9.2 MG/DL (ref 8.6–10.5)
CHLORIDE SERPL-SCNC: 107 MMOL/L (ref 98–107)
CO2 SERPL-SCNC: 27.5 MMOL/L (ref 22–29)
CREAT SERPL-MCNC: 0.91 MG/DL (ref 0.57–1)
DEPRECATED RDW RBC AUTO: 45.4 FL (ref 37–54)
EGFRCR SERPLBLD CKD-EPI 2021: 64.7 ML/MIN/1.73
EOSINOPHIL # BLD AUTO: 0.17 10*3/MM3 (ref 0–0.4)
EOSINOPHIL NFR BLD AUTO: 2.8 % (ref 0.3–6.2)
ERYTHROCYTE [DISTWIDTH] IN BLOOD BY AUTOMATED COUNT: 13.2 % (ref 12.3–15.4)
GLOBULIN UR ELPH-MCNC: 2.9 GM/DL
GLUCOSE SERPL-MCNC: 103 MG/DL (ref 65–99)
HCT VFR BLD AUTO: 37.7 % (ref 34–46.6)
HGB BLD-MCNC: 12.3 G/DL (ref 12–15.9)
HOLD SPECIMEN: NORMAL
IMM GRANULOCYTES # BLD AUTO: 0.01 10*3/MM3 (ref 0–0.05)
IMM GRANULOCYTES NFR BLD AUTO: 0.2 % (ref 0–0.5)
LYMPHOCYTES # BLD AUTO: 1.5 10*3/MM3 (ref 0.7–3.1)
LYMPHOCYTES NFR BLD AUTO: 24.3 % (ref 19.6–45.3)
MCH RBC QN AUTO: 30 PG (ref 26.6–33)
MCHC RBC AUTO-ENTMCNC: 32.6 G/DL (ref 31.5–35.7)
MCV RBC AUTO: 92 FL (ref 79–97)
MONOCYTES # BLD AUTO: 0.48 10*3/MM3 (ref 0.1–0.9)
MONOCYTES NFR BLD AUTO: 7.8 % (ref 5–12)
NEUTROPHILS NFR BLD AUTO: 3.97 10*3/MM3 (ref 1.7–7)
NEUTROPHILS NFR BLD AUTO: 64.1 % (ref 42.7–76)
PLATELET # BLD AUTO: 221 10*3/MM3 (ref 140–450)
PMV BLD AUTO: 9.3 FL (ref 6–12)
POTASSIUM SERPL-SCNC: 3.9 MMOL/L (ref 3.5–5.2)
PROT SERPL-MCNC: 7 G/DL (ref 6–8.5)
RBC # BLD AUTO: 4.1 10*6/MM3 (ref 3.77–5.28)
SODIUM SERPL-SCNC: 145 MMOL/L (ref 136–145)
WBC NRBC COR # BLD AUTO: 6.18 10*3/MM3 (ref 3.4–10.8)

## 2025-08-11 PROCEDURE — 3077F SYST BP >= 140 MM HG: CPT | Performed by: INTERNAL MEDICINE

## 2025-08-11 PROCEDURE — 1126F AMNT PAIN NOTED NONE PRSNT: CPT | Performed by: INTERNAL MEDICINE

## 2025-08-11 PROCEDURE — 1160F RVW MEDS BY RX/DR IN RCRD: CPT | Performed by: INTERNAL MEDICINE

## 2025-08-11 PROCEDURE — 1159F MED LIST DOCD IN RCRD: CPT | Performed by: INTERNAL MEDICINE

## 2025-08-11 PROCEDURE — 99214 OFFICE O/P EST MOD 30 MIN: CPT | Performed by: INTERNAL MEDICINE

## 2025-08-11 PROCEDURE — 80053 COMPREHEN METABOLIC PANEL: CPT | Performed by: INTERNAL MEDICINE

## 2025-08-11 PROCEDURE — 85025 COMPLETE CBC W/AUTO DIFF WBC: CPT

## 2025-08-11 PROCEDURE — 3080F DIAST BP >= 90 MM HG: CPT | Performed by: INTERNAL MEDICINE

## 2025-08-11 PROCEDURE — 36415 COLL VENOUS BLD VENIPUNCTURE: CPT

## (undated) DEVICE — GOWN,SIRUS,POLYRNF,BRTHSLV,XL,30/CS: Brand: MEDLINE

## (undated) DEVICE — SUT MNCRYL 4/0 PS2 27IN UD MCP426H

## (undated) DEVICE — MICRO HVTSA, 0.5G AND HVTSA SOURCEMARK PRODUCT CODE M1206 AND M1206-01: Brand: EXOFIN MICRO HVTSA, 0.5G

## (undated) DEVICE — PK MINOR LAPAROTOMY 50

## (undated) DEVICE — BANDAGE,GAUZE,BULKEE II,4.5"X4.1YD,STRL: Brand: MEDLINE

## (undated) DEVICE — ADHS SKIN PREMIERPRO EXOFIN TOPICAL HI/VISC .5ML

## (undated) DEVICE — KT SURG TURNOVER 050

## (undated) DEVICE — THE STERILE LIGHT HANDLE COVER IS USED WITH STERIS SURGICAL LIGHTING AND VISUALIZATION SYSTEMS.

## (undated) DEVICE — RESERVOIR,SUCTION,100CC,SILICONE: Brand: MEDLINE

## (undated) DEVICE — PENCL SMOKE/EVAC MEGADYNE TELESCP 15FT

## (undated) DEVICE — GARMENT COMPR 16V 1ST STAGE VEST LG 38IN BGE

## (undated) DEVICE — SUT VIC 3/0 SH 27IN J416H

## (undated) DEVICE — GLV SURG SIGNATURE ESSENTIAL PF LTX SZ8

## (undated) DEVICE — SUT SILK 2/0 SH CR8 18IN CR8 C012D

## (undated) DEVICE — COVER,MAYO STAND,STERILE: Brand: MEDLINE

## (undated) DEVICE — CVR TRANSD NEOGUARD 2X30CM LF STRL

## (undated) DEVICE — GOWN,REINFRCE,POLY,SIRUS,BREATH SLV,XXLG: Brand: MEDLINE

## (undated) DEVICE — SOL IRR H2O BO 1000ML STRL

## (undated) DEVICE — ELECTRD BLD EZ CLN MOD XLNG 2.75IN

## (undated) DEVICE — SOLUTION,WATER,IRRIGATION,1000ML,STERILE: Brand: MEDLINE

## (undated) DEVICE — PAD,ABDOMINAL,5"X9",STERILE,LF,1/PK: Brand: MEDLINE INDUSTRIES, INC.

## (undated) DEVICE — BLAKE SILICONE DRAIN, 19 FR ROUND, HUBLESS WITH 1/4" BENDABLE TROCAR: Brand: BLAKE

## (undated) DEVICE — GAUZE,SPONGE,FLUFF,6"X6.75",STRL,5/TRAY: Brand: MEDLINE

## (undated) DEVICE — THE STERILE CAMERA HANDLE COVER IS FOR USE WITH THE STERIS SURGICAL LIGHTING AND VISUALIZATION SYSTEMS.

## (undated) DEVICE — NDL HYPO PRECISIONGLIDE REG 25G 1 1/2

## (undated) DEVICE — SUT ETHLN 3/0 PS1 18IN 1663H

## (undated) DEVICE — UNDERGLV SURG BIOGEL/PI PF SYNTH SURG SZ8.5 BLU 50/BX

## (undated) DEVICE — GOWN,REINFORCE,POLY,SIRUS,BREATH SLV,XLG: Brand: MEDLINE

## (undated) DEVICE — TBG PENCL TELESCP MEGADYNE SMOKE EVAC 15FT

## (undated) DEVICE — DEV TRANSPEC W/SLD COMPR PLT

## (undated) DEVICE — GLOVE,SURG,SENSICARE SLT,LF,PF,8: Brand: MEDLINE

## (undated) DEVICE — DECANTER: Brand: UNBRANDED

## (undated) DEVICE — SUT MONOCRYL 4/0 PS2 27IN Y426H ETY426H

## (undated) DEVICE — ANTIBACTERIAL UNDYED BRAIDED (POLYGLACTIN 910), SYNTHETIC ABSORBABLE SUTURE: Brand: COATED VICRYL

## (undated) DEVICE — ANESTH CIRCUIT 60IN 3LTR-LF: Brand: MEDLINE INDUSTRIES, INC.

## (undated) DEVICE — SLV SCD CALF HEMOFORCE DVT THERP REPROC MD

## (undated) DEVICE — GOWN,SIRUS,POLYRNF,BRTHSLV,2XL,18/CS: Brand: MEDLINE